# Patient Record
Sex: MALE | Race: WHITE | NOT HISPANIC OR LATINO
[De-identification: names, ages, dates, MRNs, and addresses within clinical notes are randomized per-mention and may not be internally consistent; named-entity substitution may affect disease eponyms.]

---

## 2017-03-31 ENCOUNTER — MOBILE ON CALL (OUTPATIENT)
Age: 52
End: 2017-03-31

## 2017-05-11 ENCOUNTER — OTHER (OUTPATIENT)
Age: 52
End: 2017-05-11

## 2020-01-14 ENCOUNTER — APPOINTMENT (OUTPATIENT)
Dept: CARDIOLOGY | Facility: CLINIC | Age: 55
End: 2020-01-14

## 2020-01-21 ENCOUNTER — LABORATORY RESULT (OUTPATIENT)
Age: 55
End: 2020-01-21

## 2020-01-21 ENCOUNTER — APPOINTMENT (OUTPATIENT)
Dept: CARDIOLOGY | Facility: CLINIC | Age: 55
End: 2020-01-21
Payer: COMMERCIAL

## 2020-01-21 ENCOUNTER — NON-APPOINTMENT (OUTPATIENT)
Age: 55
End: 2020-01-21

## 2020-01-21 VITALS
HEIGHT: 71 IN | WEIGHT: 252 LBS | DIASTOLIC BLOOD PRESSURE: 98 MMHG | HEART RATE: 84 BPM | SYSTOLIC BLOOD PRESSURE: 157 MMHG | BODY MASS INDEX: 35.28 KG/M2 | OXYGEN SATURATION: 100 %

## 2020-01-21 DIAGNOSIS — R07.9 CHEST PAIN, UNSPECIFIED: ICD-10-CM

## 2020-01-21 DIAGNOSIS — I25.10 ATHEROSCLEROTIC HEART DISEASE OF NATIVE CORONARY ARTERY W/OUT ANGINA PECTORIS: ICD-10-CM

## 2020-01-21 DIAGNOSIS — I10 ESSENTIAL (PRIMARY) HYPERTENSION: ICD-10-CM

## 2020-01-21 PROCEDURE — 93000 ELECTROCARDIOGRAM COMPLETE: CPT

## 2020-01-21 PROCEDURE — 99204 OFFICE O/P NEW MOD 45 MIN: CPT

## 2020-01-21 RX ORDER — AMLODIPINE BESYLATE 5 MG/1
5 TABLET ORAL DAILY
Qty: 90 | Refills: 3 | Status: ACTIVE | COMMUNITY
Start: 2020-01-21 | End: 1900-01-01

## 2020-01-21 RX ORDER — LURASIDONE HYDROCHLORIDE 40 MG/1
40 TABLET, FILM COATED ORAL DAILY
Refills: 0 | Status: ACTIVE | COMMUNITY
Start: 2020-01-21

## 2020-01-23 LAB
ALBUMIN SERPL ELPH-MCNC: 4.6 G/DL
ALP BLD-CCNC: 86 U/L
ALT SERPL-CCNC: 18 U/L
ANION GAP SERPL CALC-SCNC: 11 MMOL/L
AST SERPL-CCNC: 13 U/L
BASOPHILS # BLD AUTO: 0.08 K/UL
BASOPHILS NFR BLD AUTO: 0.9 %
BILIRUB SERPL-MCNC: 0.6 MG/DL
BUN SERPL-MCNC: 11 MG/DL
CALCIUM SERPL-MCNC: 10.5 MG/DL
CHLORIDE SERPL-SCNC: 101 MMOL/L
CHOLEST SERPL-MCNC: 241 MG/DL
CHOLEST/HDLC SERPL: 4.9 RATIO
CO2 SERPL-SCNC: 25 MMOL/L
CREAT SERPL-MCNC: 1.03 MG/DL
EOSINOPHIL # BLD AUTO: 0.39 K/UL
EOSINOPHIL NFR BLD AUTO: 4.6 %
ESTIMATED AVERAGE GLUCOSE: 111 MG/DL
GLUCOSE SERPL-MCNC: 125 MG/DL
HBA1C MFR BLD HPLC: 5.5 %
HCT VFR BLD CALC: 44.8 %
HDLC SERPL-MCNC: 49 MG/DL
HGB BLD-MCNC: 14.5 G/DL
IMM GRANULOCYTES NFR BLD AUTO: 0.2 %
LDLC SERPL CALC-MCNC: 163 MG/DL
LYMPHOCYTES # BLD AUTO: 1.08 K/UL
LYMPHOCYTES NFR BLD AUTO: 12.7 %
MAN DIFF?: NORMAL
MCHC RBC-ENTMCNC: 28.5 PG
MCHC RBC-ENTMCNC: 32.4 GM/DL
MCV RBC AUTO: 88.2 FL
MONOCYTES # BLD AUTO: 0.7 K/UL
MONOCYTES NFR BLD AUTO: 8.2 %
NEUTROPHILS # BLD AUTO: 6.23 K/UL
NEUTROPHILS NFR BLD AUTO: 73.4 %
PLATELET # BLD AUTO: 309 K/UL
POTASSIUM SERPL-SCNC: 5.2 MMOL/L
PROT SERPL-MCNC: 7 G/DL
RBC # BLD: 5.08 M/UL
RBC # FLD: 13.8 %
SODIUM SERPL-SCNC: 137 MMOL/L
T3RU NFR SERPL: 1.1 TBI
T4 SERPL-MCNC: 6.9 UG/DL
TRIGL SERPL-MCNC: 145 MG/DL
TSH SERPL-ACNC: 5.2 UIU/ML
WBC # FLD AUTO: 8.5 K/UL

## 2020-01-23 RX ORDER — ROSUVASTATIN CALCIUM 10 MG/1
10 TABLET, FILM COATED ORAL
Qty: 90 | Refills: 3 | Status: ACTIVE | COMMUNITY
Start: 2020-01-23 | End: 1900-01-01

## 2020-01-27 ENCOUNTER — APPOINTMENT (OUTPATIENT)
Dept: CARDIOLOGY | Facility: CLINIC | Age: 55
End: 2020-01-27
Payer: COMMERCIAL

## 2020-01-27 ENCOUNTER — OTHER (OUTPATIENT)
Age: 55
End: 2020-01-27

## 2020-01-27 PROCEDURE — 93306 TTE W/DOPPLER COMPLETE: CPT

## 2020-01-29 ENCOUNTER — APPOINTMENT (OUTPATIENT)
Dept: CARDIOLOGY | Facility: CLINIC | Age: 55
End: 2020-01-29
Payer: COMMERCIAL

## 2020-01-29 PROCEDURE — 78452 HT MUSCLE IMAGE SPECT MULT: CPT

## 2020-01-29 PROCEDURE — A9500: CPT

## 2020-01-29 PROCEDURE — 93015 CV STRESS TEST SUPVJ I&R: CPT

## 2020-03-26 ENCOUNTER — APPOINTMENT (OUTPATIENT)
Dept: CARDIOLOGY | Facility: CLINIC | Age: 55
End: 2020-03-26

## 2021-02-17 ENCOUNTER — NON-APPOINTMENT (OUTPATIENT)
Age: 56
End: 2021-02-17

## 2021-03-16 ENCOUNTER — TRANSCRIPTION ENCOUNTER (OUTPATIENT)
Age: 56
End: 2021-03-16

## 2021-03-16 ENCOUNTER — EMERGENCY (EMERGENCY)
Facility: HOSPITAL | Age: 56
LOS: 0 days | Discharge: HOME | End: 2021-03-16
Attending: EMERGENCY MEDICINE
Payer: COMMERCIAL

## 2021-03-16 VITALS
TEMPERATURE: 99 F | OXYGEN SATURATION: 100 % | RESPIRATION RATE: 18 BRPM | HEART RATE: 67 BPM | SYSTOLIC BLOOD PRESSURE: 196 MMHG | DIASTOLIC BLOOD PRESSURE: 82 MMHG

## 2021-03-16 VITALS
TEMPERATURE: 98 F | SYSTOLIC BLOOD PRESSURE: 152 MMHG | WEIGHT: 250 LBS | HEART RATE: 60 BPM | RESPIRATION RATE: 18 BRPM | HEIGHT: 71 IN | OXYGEN SATURATION: 100 % | DIASTOLIC BLOOD PRESSURE: 74 MMHG

## 2021-03-16 DIAGNOSIS — E78.5 HYPERLIPIDEMIA, UNSPECIFIED: ICD-10-CM

## 2021-03-16 DIAGNOSIS — Z95.5 PRESENCE OF CORONARY ANGIOPLASTY IMPLANT AND GRAFT: ICD-10-CM

## 2021-03-16 DIAGNOSIS — E66.9 OBESITY, UNSPECIFIED: ICD-10-CM

## 2021-03-16 DIAGNOSIS — M54.9 DORSALGIA, UNSPECIFIED: ICD-10-CM

## 2021-03-16 DIAGNOSIS — I10 ESSENTIAL (PRIMARY) HYPERTENSION: ICD-10-CM

## 2021-03-16 DIAGNOSIS — N20.1 CALCULUS OF URETER: ICD-10-CM

## 2021-03-16 DIAGNOSIS — R73.03 PREDIABETES: ICD-10-CM

## 2021-03-16 DIAGNOSIS — R11.0 NAUSEA: ICD-10-CM

## 2021-03-16 DIAGNOSIS — R10.30 LOWER ABDOMINAL PAIN, UNSPECIFIED: ICD-10-CM

## 2021-03-16 DIAGNOSIS — I25.10 ATHEROSCLEROTIC HEART DISEASE OF NATIVE CORONARY ARTERY WITHOUT ANGINA PECTORIS: ICD-10-CM

## 2021-03-16 DIAGNOSIS — Z95.5 PRESENCE OF CORONARY ANGIOPLASTY IMPLANT AND GRAFT: Chronic | ICD-10-CM

## 2021-03-16 DIAGNOSIS — K59.09 OTHER CONSTIPATION: ICD-10-CM

## 2021-03-16 LAB
ALBUMIN SERPL ELPH-MCNC: 4 G/DL — SIGNIFICANT CHANGE UP (ref 3.3–5)
ALP SERPL-CCNC: 90 U/L — SIGNIFICANT CHANGE UP (ref 40–120)
ALT FLD-CCNC: 24 U/L — SIGNIFICANT CHANGE UP (ref 12–78)
ANION GAP SERPL CALC-SCNC: 4 MMOL/L — LOW (ref 5–17)
APPEARANCE UR: ABNORMAL
APTT BLD: 31.1 SEC — SIGNIFICANT CHANGE UP (ref 27.5–35.5)
AST SERPL-CCNC: 12 U/L — LOW (ref 15–37)
BASOPHILS # BLD AUTO: 0.06 K/UL — SIGNIFICANT CHANGE UP (ref 0–0.2)
BASOPHILS NFR BLD AUTO: 0.8 % — SIGNIFICANT CHANGE UP (ref 0–2)
BILIRUB SERPL-MCNC: 0.9 MG/DL — SIGNIFICANT CHANGE UP (ref 0.2–1.2)
BILIRUB UR-MCNC: NEGATIVE — SIGNIFICANT CHANGE UP
BUN SERPL-MCNC: 15 MG/DL — SIGNIFICANT CHANGE UP (ref 7–23)
CALCIUM SERPL-MCNC: 10.8 MG/DL — HIGH (ref 8.5–10.1)
CHLORIDE SERPL-SCNC: 107 MMOL/L — SIGNIFICANT CHANGE UP (ref 96–108)
CO2 SERPL-SCNC: 26 MMOL/L — SIGNIFICANT CHANGE UP (ref 22–31)
COLOR SPEC: YELLOW — SIGNIFICANT CHANGE UP
CREAT SERPL-MCNC: 1.03 MG/DL — SIGNIFICANT CHANGE UP (ref 0.5–1.3)
DIFF PNL FLD: ABNORMAL
EOSINOPHIL # BLD AUTO: 0.25 K/UL — SIGNIFICANT CHANGE UP (ref 0–0.5)
EOSINOPHIL NFR BLD AUTO: 3.2 % — SIGNIFICANT CHANGE UP (ref 0–6)
GLUCOSE SERPL-MCNC: 107 MG/DL — HIGH (ref 70–99)
GLUCOSE UR QL: NEGATIVE — SIGNIFICANT CHANGE UP
HCT VFR BLD CALC: 41.7 % — SIGNIFICANT CHANGE UP (ref 39–50)
HGB BLD-MCNC: 13.7 G/DL — SIGNIFICANT CHANGE UP (ref 13–17)
IMM GRANULOCYTES NFR BLD AUTO: 0.3 % — SIGNIFICANT CHANGE UP (ref 0–1.5)
INR BLD: 1.2 RATIO — HIGH (ref 0.88–1.16)
KETONES UR-MCNC: NEGATIVE — SIGNIFICANT CHANGE UP
LACTATE SERPL-SCNC: 1.7 MMOL/L — SIGNIFICANT CHANGE UP (ref 0.7–2)
LEUKOCYTE ESTERASE UR-ACNC: NEGATIVE — SIGNIFICANT CHANGE UP
LIDOCAIN IGE QN: 44 U/L — LOW (ref 73–393)
LYMPHOCYTES # BLD AUTO: 1.33 K/UL — SIGNIFICANT CHANGE UP (ref 1–3.3)
LYMPHOCYTES # BLD AUTO: 17.1 % — SIGNIFICANT CHANGE UP (ref 13–44)
MCHC RBC-ENTMCNC: 28.7 PG — SIGNIFICANT CHANGE UP (ref 27–34)
MCHC RBC-ENTMCNC: 32.9 GM/DL — SIGNIFICANT CHANGE UP (ref 32–36)
MCV RBC AUTO: 87.2 FL — SIGNIFICANT CHANGE UP (ref 80–100)
MONOCYTES # BLD AUTO: 0.69 K/UL — SIGNIFICANT CHANGE UP (ref 0–0.9)
MONOCYTES NFR BLD AUTO: 8.9 % — SIGNIFICANT CHANGE UP (ref 2–14)
NEUTROPHILS # BLD AUTO: 5.42 K/UL — SIGNIFICANT CHANGE UP (ref 1.8–7.4)
NEUTROPHILS NFR BLD AUTO: 69.7 % — SIGNIFICANT CHANGE UP (ref 43–77)
NITRITE UR-MCNC: NEGATIVE — SIGNIFICANT CHANGE UP
PH UR: 6 — SIGNIFICANT CHANGE UP (ref 5–8)
PLATELET # BLD AUTO: 290 K/UL — SIGNIFICANT CHANGE UP (ref 150–400)
POTASSIUM SERPL-MCNC: 4.4 MMOL/L — SIGNIFICANT CHANGE UP (ref 3.5–5.3)
POTASSIUM SERPL-SCNC: 4.4 MMOL/L — SIGNIFICANT CHANGE UP (ref 3.5–5.3)
PROT SERPL-MCNC: 7.8 GM/DL — SIGNIFICANT CHANGE UP (ref 6–8.3)
PROT UR-MCNC: 15
PROTHROM AB SERPL-ACNC: 13.8 SEC — HIGH (ref 10.6–13.6)
RBC # BLD: 4.78 M/UL — SIGNIFICANT CHANGE UP (ref 4.2–5.8)
RBC # FLD: 13.4 % — SIGNIFICANT CHANGE UP (ref 10.3–14.5)
SODIUM SERPL-SCNC: 137 MMOL/L — SIGNIFICANT CHANGE UP (ref 135–145)
SP GR SPEC: 1 — LOW (ref 1.01–1.02)
UROBILINOGEN FLD QL: NEGATIVE — SIGNIFICANT CHANGE UP
WBC # BLD: 7.77 K/UL — SIGNIFICANT CHANGE UP (ref 3.8–10.5)
WBC # FLD AUTO: 7.77 K/UL — SIGNIFICANT CHANGE UP (ref 3.8–10.5)

## 2021-03-16 PROCEDURE — 96374 THER/PROPH/DIAG INJ IV PUSH: CPT

## 2021-03-16 PROCEDURE — 86901 BLOOD TYPING SEROLOGIC RH(D): CPT

## 2021-03-16 PROCEDURE — 87086 URINE CULTURE/COLONY COUNT: CPT

## 2021-03-16 PROCEDURE — 36415 COLL VENOUS BLD VENIPUNCTURE: CPT

## 2021-03-16 PROCEDURE — 99284 EMERGENCY DEPT VISIT MOD MDM: CPT | Mod: 25

## 2021-03-16 PROCEDURE — 83690 ASSAY OF LIPASE: CPT

## 2021-03-16 PROCEDURE — 83605 ASSAY OF LACTIC ACID: CPT

## 2021-03-16 PROCEDURE — 85025 COMPLETE CBC W/AUTO DIFF WBC: CPT

## 2021-03-16 PROCEDURE — 86900 BLOOD TYPING SEROLOGIC ABO: CPT

## 2021-03-16 PROCEDURE — 80053 COMPREHEN METABOLIC PANEL: CPT

## 2021-03-16 PROCEDURE — 99285 EMERGENCY DEPT VISIT HI MDM: CPT

## 2021-03-16 PROCEDURE — 74177 CT ABD & PELVIS W/CONTRAST: CPT | Mod: 26

## 2021-03-16 PROCEDURE — 85610 PROTHROMBIN TIME: CPT

## 2021-03-16 PROCEDURE — 85730 THROMBOPLASTIN TIME PARTIAL: CPT

## 2021-03-16 PROCEDURE — 86850 RBC ANTIBODY SCREEN: CPT

## 2021-03-16 PROCEDURE — 81001 URINALYSIS AUTO W/SCOPE: CPT

## 2021-03-16 PROCEDURE — 74177 CT ABD & PELVIS W/CONTRAST: CPT

## 2021-03-16 RX ORDER — IBUPROFEN 200 MG
1 TABLET ORAL
Qty: 20 | Refills: 0
Start: 2021-03-16 | End: 2021-03-20

## 2021-03-16 RX ORDER — ACETAMINOPHEN 500 MG
2 TABLET ORAL
Qty: 40 | Refills: 0
Start: 2021-03-16 | End: 2021-03-20

## 2021-03-16 RX ORDER — SODIUM CHLORIDE 9 MG/ML
1000 INJECTION INTRAMUSCULAR; INTRAVENOUS; SUBCUTANEOUS ONCE
Refills: 0 | Status: COMPLETED | OUTPATIENT
Start: 2021-03-16 | End: 2021-03-16

## 2021-03-16 RX ORDER — TAMSULOSIN HYDROCHLORIDE 0.4 MG/1
1 CAPSULE ORAL
Qty: 7 | Refills: 0
Start: 2021-03-16 | End: 2021-03-22

## 2021-03-16 RX ORDER — OXYCODONE HYDROCHLORIDE 5 MG/1
1 TABLET ORAL
Qty: 12 | Refills: 0
Start: 2021-03-16 | End: 2021-03-18

## 2021-03-16 RX ORDER — KETOROLAC TROMETHAMINE 30 MG/ML
15 SYRINGE (ML) INJECTION ONCE
Refills: 0 | Status: DISCONTINUED | OUTPATIENT
Start: 2021-03-16 | End: 2021-03-16

## 2021-03-16 RX ADMIN — Medication 15 MILLIGRAM(S): at 21:52

## 2021-03-16 RX ADMIN — SODIUM CHLORIDE 1000 MILLILITER(S): 9 INJECTION INTRAMUSCULAR; INTRAVENOUS; SUBCUTANEOUS at 19:57

## 2021-03-16 NOTE — ED STATDOCS - PROGRESS NOTE DETAILS
56 y/o M presents with L flank pain since last night. pt reports severe L sided flank pain that radiating to lower abdomen pain was severe in intensity associated with nausea, pain has since improved. Pt had small BM this morning. Denies fever/chills, v/d, Cp, SOB, urinary sx or other complaints at this time  abd: soft, nd, NTTP. No r/g/r. -Alvaro Michael PA-C Results reviewed and discussed with pt. Pt is feeling well, will dc with pain control, flomax, and urology FU. Discussed importance of close FU with PMD. Pt asked to return to ED immediately for any new or concerning sx or worsening. Pt acknowledges and understands plan

## 2021-03-16 NOTE — ED STATDOCS - OBJECTIVE STATEMENT
56 y/o M with PMHx of CAD s/p coronary artery stent placement, HTN, HLD, prediabetes, chronic constipation, obesity, and anxiety presents ambulatory to the ED c/o intermittent severe +abd cramping beginning last night. Also with stabbing +back pain. Took Miralax last night for chronic constipation, had small BM then pain began. In the ED currently without pain. No fever. Former smoker. NKDA.

## 2021-03-16 NOTE — ED ADULT TRIAGE NOTE - CHIEF COMPLAINT QUOTE
Pt states onset of left flank pain + nausea, last bowel movement was last night but c/o chronic constipation. Pt denies chest pain or shortness of breath.

## 2021-03-16 NOTE — ED STATDOCS - PMH
Anxiety    CAD (coronary artery disease)    Chronic constipation    HLD (hyperlipidemia)    HTN (hypertension)    Obesity    Prediabetes

## 2021-03-16 NOTE — ED STATDOCS - NS_ ATTENDINGSCRIBEDETAILS _ED_A_ED_FT
I, Angelito Salgado MD,  performed the initial face to face bedside interview with this patient regarding history of present illness, review of symptoms and relevant past medical, social and family history.  I completed an independent physical examination.    The history, relevant review of systems, past medical and surgical history, medical decision making, and physical examination was documented by the scribe in my presence and I attest to the accuracy of the documentation.

## 2021-03-16 NOTE — ED STATDOCS - NSFOLLOWUPINSTRUCTIONS_ED_ALL_ED_FT
Please follow up with Primary MD in 2-3 days. Return to ED immediately for any new or concerning symptoms or worsening symptoms     Kidney Stones    WHAT YOU NEED TO KNOW:    Kidney stones form in the urinary system when the water and waste in your urine are out of balance. When this happens, certain types of waste crystals separate from the urine. The crystals build up and form kidney stones. You may have more than one kidney stone.     DISCHARGE INSTRUCTIONS:    Return to the emergency department if:     You have vomiting that is not relieved by medicine.        Contact your healthcare provider if:     You have a fever.       You have trouble passing urine.      You see blood in your urine.      You have severe pain.      You have any questions or concerns about your condition or care.    Medicines:     NSAIDs, such as ibuprofen, help decrease swelling, pain, and fever. This medicine is available with or without a doctor's order. NSAIDs can cause stomach bleeding or kidney problems in certain people. If you take blood thinner medicine, always ask your healthcare provider if NSAIDs are safe for you. Always read the medicine label and follow directions.      Prescription pain medicine may be given. Ask your healthcare provider how to take this medicine safely. Some prescription pain medicines contain acetaminophen. Do not take other medicines that contain acetaminophen without talking to your healthcare provider. Too much acetaminophen may cause liver damage. Prescription pain medicine may cause constipation. Ask your healthcare provider how to prevent or treat constipation.       Medicines to balance your electrolytes may be needed.       Take your medicine as directed. Contact your healthcare provider if you think your medicine is not helping or if you have side effects. Tell him or her if you are allergic to any medicine. Keep a list of the medicines, vitamins, and herbs you take. Include the amounts, and when and why you take them. Bring the list or the pill bottles to follow-up visits. Carry your medicine list with you in case of an emergency.    Follow up with your healthcare provider as directed: You may need to return for more tests. Write down your questions so you remember to ask them during your visits.    What you can do to manage kidney stones:     Drink more liquids. Your healthcare provider may tell you to drink at least 8 to 12 (eight-ounce) cups of liquids each day. This helps flush out the kidney stones when you urinate. Water is the best liquid to drink.      Strain your urine every time you go to the bathroom. Urinate through a strainer or a piece of thin cloth to catch the stones. Take the stones to your healthcare provider so they can be sent to the lab for tests. This will help your healthcare providers plan the best treatment for you.Look for Stones in the Filter           Eat a variety of healthy foods. Healthy foods include fruits, vegetables, whole-grain breads, low-fat dairy products, beans, and fish. You may need to limit how much sodium (salt) or protein you eat. Ask for information about the best foods for you.      Stay active. Your stones may pass more easily if you stay active. Exercise can also help you manage your weight. Ask about the best activities for you.    After you pass the kidney stones: Your healthcare provider may order a 24-hour urine test. Results from a 24-hour urine test will help your healthcare provider plan ways to prevent more stones from forming. Your healthcare provider will give you more instructions.

## 2021-03-16 NOTE — ED STATDOCS - CARE PROVIDER_API CALL
Uziel Leone)  Urology  [address], [address] [place] [address]  Phone: [phone]  Fax: [phone]  Follow Up Time: 4-6 Days

## 2021-03-16 NOTE — ED STATDOCS - PATIENT PORTAL LINK FT
You can access the FollowMyHealth Patient Portal offered by [facility] by registering at the following website: [email or web address]. By joining [facility]’s FollowMyHealth portal, you will also be able to view your health information using other applications (apps) compatible with our system.

## 2021-03-17 LAB
CULTURE RESULTS: SIGNIFICANT CHANGE UP
SPECIMEN SOURCE: SIGNIFICANT CHANGE UP

## 2021-03-17 RX ORDER — OXYCODONE HYDROCHLORIDE 5 MG/1
1 TABLET ORAL
Qty: 12 | Refills: 0
Start: 2021-03-17 | End: 2021-03-19

## 2021-04-12 ENCOUNTER — APPOINTMENT (OUTPATIENT)
Dept: UROLOGY | Facility: CLINIC | Age: 56
End: 2021-04-12

## 2021-10-04 NOTE — ED ADULT NURSE NOTE - OBJECTIVE STATEMENT
Neck , no lymphadenopathy
Patient presents to ED complaining of flank pain. Patient complaining of sudden onset of abdominal pain radiating to L flank starting last night, patient complaining of one episode of vomiting, states due to pain. Patient denies urinary symptoms, symptoms [id number] on arrival. Patient complaining of chronic constipation, BM yesterday. Patient denies fever, chills, nausea, diarrhea, SOB.

## 2021-10-07 ENCOUNTER — OUTPATIENT (OUTPATIENT)
Dept: OUTPATIENT SERVICES | Facility: HOSPITAL | Age: 56
LOS: 1 days | Discharge: STILL ADMITTED OR ADMINISTRATIVE | End: 2021-10-07
Payer: MEDICAID

## 2021-10-07 DIAGNOSIS — F31.9 BIPOLAR DISORDER, UNSPECIFIED: ICD-10-CM

## 2021-10-07 DIAGNOSIS — Z95.5 PRESENCE OF CORONARY ANGIOPLASTY IMPLANT AND GRAFT: Chronic | ICD-10-CM

## 2021-10-07 PROBLEM — R73.03 PREDIABETES: Chronic | Status: ACTIVE | Noted: 2021-03-16

## 2021-10-07 PROBLEM — I10 ESSENTIAL (PRIMARY) HYPERTENSION: Chronic | Status: ACTIVE | Noted: 2021-03-16

## 2021-10-07 PROBLEM — F41.9 ANXIETY DISORDER, UNSPECIFIED: Chronic | Status: ACTIVE | Noted: 2021-03-16

## 2021-10-07 PROBLEM — I25.10 ATHEROSCLEROTIC HEART DISEASE OF NATIVE CORONARY ARTERY WITHOUT ANGINA PECTORIS: Chronic | Status: ACTIVE | Noted: 2021-03-16

## 2021-10-07 PROBLEM — E78.5 HYPERLIPIDEMIA, UNSPECIFIED: Chronic | Status: ACTIVE | Noted: 2021-03-16

## 2021-10-07 PROBLEM — E66.9 OBESITY, UNSPECIFIED: Chronic | Status: ACTIVE | Noted: 2021-03-16

## 2021-10-07 PROBLEM — K59.09 OTHER CONSTIPATION: Chronic | Status: ACTIVE | Noted: 2021-03-16

## 2021-10-07 PROCEDURE — 99215 OFFICE O/P EST HI 40 MIN: CPT

## 2023-07-09 ENCOUNTER — NON-APPOINTMENT (OUTPATIENT)
Age: 58
End: 2023-07-09

## 2023-07-09 ENCOUNTER — INPATIENT (INPATIENT)
Facility: HOSPITAL | Age: 58
LOS: 3 days | Discharge: HOME | DRG: 392 | End: 2023-07-13
Attending: FAMILY MEDICINE | Admitting: SURGERY
Payer: MEDICAID

## 2023-07-09 VITALS
HEART RATE: 87 BPM | SYSTOLIC BLOOD PRESSURE: 118 MMHG | WEIGHT: 238.1 LBS | HEIGHT: 71 IN | OXYGEN SATURATION: 98 % | TEMPERATURE: 98 F | DIASTOLIC BLOOD PRESSURE: 67 MMHG | RESPIRATION RATE: 16 BRPM

## 2023-07-09 DIAGNOSIS — Z95.5 PRESENCE OF CORONARY ANGIOPLASTY IMPLANT AND GRAFT: Chronic | ICD-10-CM

## 2023-07-09 PROCEDURE — 93010 ELECTROCARDIOGRAM REPORT: CPT

## 2023-07-09 PROCEDURE — 99285 EMERGENCY DEPT VISIT HI MDM: CPT

## 2023-07-09 RX ORDER — SODIUM CHLORIDE 9 MG/ML
1000 INJECTION INTRAMUSCULAR; INTRAVENOUS; SUBCUTANEOUS ONCE
Refills: 0 | Status: COMPLETED | OUTPATIENT
Start: 2023-07-09 | End: 2023-07-09

## 2023-07-09 NOTE — ED PROVIDER NOTE - NSICDXPASTMEDICALHX_GEN_ALL_CORE_FT
PAST MEDICAL HISTORY:  Anxiety     CAD (coronary artery disease)     Chronic constipation     HLD (hyperlipidemia)     HTN (hypertension)     Obesity     Prediabetes

## 2023-07-09 NOTE — ED ADULT TRIAGE NOTE - CHIEF COMPLAINT QUOTE
Comes to ED for worsening fatigue and inability to care for self.  Pt also endorses bloody stools, appears pale in triage, abdomen distended.  Sister stated that pt has lost a lot of weight over the last few months and appears to be 'wasting away'.

## 2023-07-09 NOTE — ED PROVIDER NOTE - SIGNIFICANT NEGATIVE FINDINGS
no headache, no chest pain, no SOB, no palpitations, no fever, no chills,  no n/v/d, no urinary symptoms . no neuro changes.

## 2023-07-09 NOTE — ED PROVIDER NOTE - RELIEVING FACTORS
Pt stating rx for amlodipine needs to be resent to [facility] will not cover rx at [facility] due to new [id number]-   no

## 2023-07-09 NOTE — ED PROVIDER NOTE - OBJECTIVE STATEMENT
Comes to ED for worsening fatigue and inability to care for self.  Pt also endorses bloody stools, appears pale in triage, abdomen distended.  Sister stated that pt has lost a lot of weight over the last few months and appears to be 'wasting away'.  see chief complaint quote Comes to ED for worsening fatigue and inability to care for self.  Pt also endorses bloody stools, appears pale in triage, abdomen distended.  Sister stated that pt has lost a lot of weight over the last few months and appears to be 'wasting away'.  see chief complaint quote Gi Dr Perlman, skipped colonoscopy and blood testing including CEA level. 58 y/o male h/o Anxiety CAD Stent Chronic constipation HLD HTN, Renal stone  C/C abdominal pain, bloating, distention, bloody stools. His sister notes that he is experiencing increasing weakness,  fatigue, unsteadiness  and inability to care for self.   She states that the patient  lost 40 pounds  over the past  few months and appears to be 'wasting away'. He is  non compliant with his doctors appointments and missed his colonoscopy appointment, also missed going for his  blood testing witch included CEA level, his GI is  Dr Perlman  no headache, no chest pain, no SOB, no palpitations, no fever, no chills,  no n/v/d, no urinary symptoms . no neuro changes.

## 2023-07-09 NOTE — ED ADULT TRIAGE NOTE - PAIN: PRESENCE, MLM
PT ADVISED TO HOLD PRESSURE TO THE SOCKETS WHERE THE TEETH WERE PULLED, COLD ICE WATER. IF IT JUST DOESN'T STOP HE WILL NEED TO GO BACK TO THE ER. PT VERBALIZED UNDERSTANDING      ----- Message from Carmen Flores sent at 4/27/2023  8:23 AM EDT -----  Patient had dental surgery on Monday. Went to the ER for excessive bleeding, tender, they packed his mouth with gauze with acid on them and sent him home. He saw the surgeon yesterday and he did nothing for him. He continues to have excessive bleeding.       complains of pain/discomfort

## 2023-07-10 ENCOUNTER — TRANSCRIPTION ENCOUNTER (OUTPATIENT)
Age: 58
End: 2023-07-10

## 2023-07-10 DIAGNOSIS — K56.2 VOLVULUS: ICD-10-CM

## 2023-07-10 DIAGNOSIS — R14.0 ABDOMINAL DISTENSION (GASEOUS): ICD-10-CM

## 2023-07-10 DIAGNOSIS — Z29.9 ENCOUNTER FOR PROPHYLACTIC MEASURES, UNSPECIFIED: ICD-10-CM

## 2023-07-10 LAB
ALBUMIN SERPL ELPH-MCNC: 3.1 G/DL — LOW (ref 3.3–5)
ALP SERPL-CCNC: 121 U/L — HIGH (ref 40–120)
ALT FLD-CCNC: 19 U/L — SIGNIFICANT CHANGE UP (ref 12–78)
AMYLASE P1 CFR SERPL: 29 U/L — SIGNIFICANT CHANGE UP (ref 25–125)
ANION GAP SERPL CALC-SCNC: 3 MMOL/L — LOW (ref 5–17)
APPEARANCE UR: CLEAR — SIGNIFICANT CHANGE UP
APTT BLD: 28.7 SEC — SIGNIFICANT CHANGE UP (ref 27.5–35.5)
AST SERPL-CCNC: 17 U/L — SIGNIFICANT CHANGE UP (ref 15–37)
BASOPHILS # BLD AUTO: 0.04 K/UL — SIGNIFICANT CHANGE UP (ref 0–0.2)
BASOPHILS NFR BLD AUTO: 0.8 % — SIGNIFICANT CHANGE UP (ref 0–2)
BILIRUB SERPL-MCNC: 0.6 MG/DL — SIGNIFICANT CHANGE UP (ref 0.2–1.2)
BILIRUB UR-MCNC: NEGATIVE — SIGNIFICANT CHANGE UP
BLD GP AB SCN SERPL QL: SIGNIFICANT CHANGE UP
BUN SERPL-MCNC: 26 MG/DL — HIGH (ref 7–23)
CALCIUM SERPL-MCNC: 11.9 MG/DL — HIGH (ref 8.5–10.1)
CALCIUM SERPL-MCNC: 12 MG/DL — HIGH (ref 8.5–10.1)
CHLORIDE SERPL-SCNC: 110 MMOL/L — HIGH (ref 96–108)
CO2 SERPL-SCNC: 28 MMOL/L — SIGNIFICANT CHANGE UP (ref 22–31)
COLOR SPEC: YELLOW — SIGNIFICANT CHANGE UP
CREAT SERPL-MCNC: 1.1 MG/DL — SIGNIFICANT CHANGE UP (ref 0.5–1.3)
DIFF PNL FLD: NEGATIVE — SIGNIFICANT CHANGE UP
EGFR: 78 ML/MIN/1.73M2 — SIGNIFICANT CHANGE UP
EOSINOPHIL # BLD AUTO: 0.39 K/UL — SIGNIFICANT CHANGE UP (ref 0–0.5)
EOSINOPHIL NFR BLD AUTO: 8.2 % — HIGH (ref 0–6)
ETHANOL SERPL-MCNC: <10 MG/DL — SIGNIFICANT CHANGE UP (ref 0–10)
GLUCOSE SERPL-MCNC: 121 MG/DL — HIGH (ref 70–99)
GLUCOSE UR QL: NEGATIVE MG/DL — SIGNIFICANT CHANGE UP
HCT VFR BLD CALC: 34.9 % — LOW (ref 39–50)
HGB BLD-MCNC: 11.3 G/DL — LOW (ref 13–17)
IMM GRANULOCYTES NFR BLD AUTO: 0.2 % — SIGNIFICANT CHANGE UP (ref 0–0.9)
INR BLD: 1.33 RATIO — HIGH (ref 0.88–1.16)
KETONES UR-MCNC: NEGATIVE MG/DL — SIGNIFICANT CHANGE UP
LACTATE SERPL-SCNC: 0.8 MMOL/L — SIGNIFICANT CHANGE UP (ref 0.7–2)
LEUKOCYTE ESTERASE UR-ACNC: NEGATIVE — SIGNIFICANT CHANGE UP
LIDOCAIN IGE QN: 85 U/L — SIGNIFICANT CHANGE UP (ref 73–393)
LYMPHOCYTES # BLD AUTO: 1.12 K/UL — SIGNIFICANT CHANGE UP (ref 1–3.3)
LYMPHOCYTES # BLD AUTO: 23.4 % — SIGNIFICANT CHANGE UP (ref 13–44)
MCHC RBC-ENTMCNC: 27.1 PG — SIGNIFICANT CHANGE UP (ref 27–34)
MCHC RBC-ENTMCNC: 32.4 GM/DL — SIGNIFICANT CHANGE UP (ref 32–36)
MCV RBC AUTO: 83.7 FL — SIGNIFICANT CHANGE UP (ref 80–100)
MONOCYTES # BLD AUTO: 0.71 K/UL — SIGNIFICANT CHANGE UP (ref 0–0.9)
MONOCYTES NFR BLD AUTO: 14.9 % — HIGH (ref 2–14)
NEUTROPHILS # BLD AUTO: 2.51 K/UL — SIGNIFICANT CHANGE UP (ref 1.8–7.4)
NEUTROPHILS NFR BLD AUTO: 52.5 % — SIGNIFICANT CHANGE UP (ref 43–77)
NITRITE UR-MCNC: NEGATIVE — SIGNIFICANT CHANGE UP
NRBC # BLD: 0 /100 WBCS — SIGNIFICANT CHANGE UP (ref 0–0)
PH UR: 5.5 — SIGNIFICANT CHANGE UP (ref 5–8)
PLATELET # BLD AUTO: 183 K/UL — SIGNIFICANT CHANGE UP (ref 150–400)
POTASSIUM SERPL-MCNC: 4 MMOL/L — SIGNIFICANT CHANGE UP (ref 3.5–5.3)
POTASSIUM SERPL-SCNC: 4 MMOL/L — SIGNIFICANT CHANGE UP (ref 3.5–5.3)
PROT SERPL-MCNC: 6.3 G/DL — SIGNIFICANT CHANGE UP (ref 6–8.3)
PROT UR-MCNC: NEGATIVE MG/DL — SIGNIFICANT CHANGE UP
PROTHROM AB SERPL-ACNC: 15.6 SEC — HIGH (ref 10.5–13.4)
RBC # BLD: 4.17 M/UL — LOW (ref 4.2–5.8)
RBC # FLD: 14.3 % — SIGNIFICANT CHANGE UP (ref 10.3–14.5)
SODIUM SERPL-SCNC: 141 MMOL/L — SIGNIFICANT CHANGE UP (ref 135–145)
SP GR SPEC: 1.02 — SIGNIFICANT CHANGE UP (ref 1–1.03)
UROBILINOGEN FLD QL: 1 MG/DL — SIGNIFICANT CHANGE UP (ref 0.2–1)
WBC # BLD: 4.78 K/UL — SIGNIFICANT CHANGE UP (ref 3.8–10.5)
WBC # FLD AUTO: 4.78 K/UL — SIGNIFICANT CHANGE UP (ref 3.8–10.5)

## 2023-07-10 PROCEDURE — 71260 CT THORAX DX C+: CPT | Mod: 26,MA

## 2023-07-10 PROCEDURE — 74177 CT ABD & PELVIS W/CONTRAST: CPT | Mod: 26,MA

## 2023-07-10 PROCEDURE — 70450 CT HEAD/BRAIN W/O DYE: CPT | Mod: 26,MA

## 2023-07-10 PROCEDURE — 99222 1ST HOSP IP/OBS MODERATE 55: CPT

## 2023-07-10 RX ORDER — LUMATEPERONE 10.5 MG/1
1 CAPSULE ORAL
Refills: 0 | DISCHARGE

## 2023-07-10 RX ORDER — AMLODIPINE BESYLATE 2.5 MG/1
2.5 TABLET ORAL EVERY 24 HOURS
Refills: 0 | Status: DISCONTINUED | OUTPATIENT
Start: 2023-07-10 | End: 2023-07-11

## 2023-07-10 RX ORDER — LAMOTRIGINE 25 MG/1
200 TABLET, ORALLY DISINTEGRATING ORAL DAILY
Refills: 0 | Status: DISCONTINUED | OUTPATIENT
Start: 2023-07-10 | End: 2023-07-13

## 2023-07-10 RX ORDER — LIOTHYRONINE SODIUM 25 UG/1
200 TABLET ORAL DAILY
Refills: 0 | Status: DISCONTINUED | OUTPATIENT
Start: 2023-07-10 | End: 2023-07-13

## 2023-07-10 RX ORDER — HEPARIN SODIUM 5000 [USP'U]/ML
5000 INJECTION INTRAVENOUS; SUBCUTANEOUS EVERY 12 HOURS
Refills: 0 | Status: DISCONTINUED | OUTPATIENT
Start: 2023-07-10 | End: 2023-07-10

## 2023-07-10 RX ORDER — QUETIAPINE FUMARATE 200 MG/1
50 TABLET, FILM COATED ORAL AT BEDTIME
Refills: 0 | Status: DISCONTINUED | OUTPATIENT
Start: 2023-07-10 | End: 2023-07-13

## 2023-07-10 RX ORDER — LAMOTRIGINE 25 MG/1
1 TABLET, ORALLY DISINTEGRATING ORAL
Refills: 0 | DISCHARGE

## 2023-07-10 RX ORDER — QUETIAPINE FUMARATE 200 MG/1
1 TABLET, FILM COATED ORAL
Refills: 0 | DISCHARGE

## 2023-07-10 RX ORDER — ONDANSETRON 8 MG/1
4 TABLET, FILM COATED ORAL
Refills: 0 | Status: DISCONTINUED | OUTPATIENT
Start: 2023-07-10 | End: 2023-07-13

## 2023-07-10 RX ORDER — LITHIUM CARBONATE 300 MG/1
450 TABLET, EXTENDED RELEASE ORAL AT BEDTIME
Refills: 0 | Status: DISCONTINUED | OUTPATIENT
Start: 2023-07-10 | End: 2023-07-13

## 2023-07-10 RX ORDER — SOD SULF/SODIUM/NAHCO3/KCL/PEG
1000 SOLUTION, RECONSTITUTED, ORAL ORAL EVERY 4 HOURS
Refills: 0 | Status: COMPLETED | OUTPATIENT
Start: 2023-07-10 | End: 2023-07-11

## 2023-07-10 RX ORDER — LIOTHYRONINE SODIUM 25 UG/1
4 TABLET ORAL
Refills: 0 | DISCHARGE

## 2023-07-10 RX ORDER — LITHIUM CARBONATE 300 MG/1
1 TABLET, EXTENDED RELEASE ORAL
Refills: 0 | DISCHARGE

## 2023-07-10 RX ADMIN — QUETIAPINE FUMARATE 50 MILLIGRAM(S): 200 TABLET, FILM COATED ORAL at 21:47

## 2023-07-10 RX ADMIN — Medication 10 MILLIGRAM(S): at 19:05

## 2023-07-10 RX ADMIN — SODIUM CHLORIDE 1000 MILLILITER(S): 9 INJECTION INTRAMUSCULAR; INTRAVENOUS; SUBCUTANEOUS at 01:11

## 2023-07-10 RX ADMIN — AMLODIPINE BESYLATE 2.5 MILLIGRAM(S): 2.5 TABLET ORAL at 19:04

## 2023-07-10 RX ADMIN — LITHIUM CARBONATE 450 MILLIGRAM(S): 300 TABLET, EXTENDED RELEASE ORAL at 21:47

## 2023-07-10 RX ADMIN — SODIUM CHLORIDE 1000 MILLILITER(S): 9 INJECTION INTRAMUSCULAR; INTRAVENOUS; SUBCUTANEOUS at 00:11

## 2023-07-10 NOTE — ED ADULT NURSE NOTE - PAIN: PRESENCE, MLM
The patient is a 51y Female complaining of 
abdominal pain, Nausea without vomiting/complains of pain/discomfort

## 2023-07-10 NOTE — CONSULT NOTE ADULT - ASSESSMENT
Patient is a 58yo male with a PMH of HTN, HLD, CAD s/p PCIx3 stents in 2016, Bipolar disorder and failure to thrive for the past 6 months. Scheduled for colonoscopy with GI tomorrow.     - EKG shows 1st degree AV block, not present in previous EKG in 2020   - Patient is not complaining of any cardiac symptoms at this time.  - No meaningful evidence of volume overload.  - BP well controlled, monitor routine hemodynamics.  - Continue Amlodipine   - Monitor and replete lytes, keep K>4, Mg>2.  - Pt has history of CAD s/p PCI 3 stents in 2016. No other notable cardiac risk factors.  - RCRI score 6% class II risk  - Mets 3, patient has difficulty walking long distance or climbing up stairs   - Pt is optimized from cardiovascular standpoint to proceed with planned procedure with routine hemodynamic monitoring.   - Other cardiovascular workup will depend on clinical course.  - All other workup per primary team.  - Will continue to follow.             Patient is a 58yo male with a PMH of HTN, HLD, CAD s/p PCIx3 stents in 2016, Bipolar disorder and failure to thrive for the past 6 months. Scheduled for colonoscopy with GI tomorrow.     - No signs of significant ischemia or volume overload.   - EKG shows 1st degree AV block, not present in previous EKG in 2020   -  Pt has history of CAD s/p PCI 3 stents in 2016.    - not on antiplatelets. consider starting ASA if possible.     - bp controlled.   - Continue Amlodipine     - RCRI score 6% class II risk  - Mets 3, patient has difficulty walking long distance or climbing up stairs   - Pt is optimized from cardiovascular standpoint to proceed with planned procedure with routine hemodynamic monitoring.     - Other cardiovascular workup will depend on clinical course.  - All other workup per primary team.  - Will continue to follow.

## 2023-07-10 NOTE — ED ADULT NURSE REASSESSMENT NOTE - NS ED NURSE REASSESS COMMENT FT1
Discussed with MD Gramajo.  Surgical PA in ED as well to speak to pt.  MD explained that procedure can't be done without bowel prep, pt understands.

## 2023-07-10 NOTE — H&P ADULT - NSHPLABSRESULTS_GEN_ALL_CORE
Lactate, Blood (07.10.23 @ 05:30)    Lactate, Blood: 0.8 mmol/L  Amylase (07.09.23 @ 23:55)    Amylase: 29 U/L  Lipase (07.09.23 @ 23:55)    Lipase: 85 U/L  Complete Blood Count + Automated Diff (07.09.23 @ 23:55)    WBC Count: 4.78 K/uL    RBC Count: 4.17 M/uL    Hemoglobin: 11.3 g/dL    Hematocrit: 34.9 %    Mean Cell Volume: 83.7 fl    Mean Cell Hemoglobin: 27.1 pg    Mean Cell Hemoglobin Conc: 32.4 gm/dL    Red Cell Distrib Width: 14.3 %    Platelet Count - Automated: 183 K/uL    Auto Neutrophil #: 2.51 K/uL    Auto Lymphocyte #: 1.12 K/uL    Auto Monocyte #: 0.71 K/uL    Auto Eosinophil #: 0.39 K/uL    Auto Basophil #: 0.04 K/uL    Auto Neutrophil %: 52.5: Differential percentages must be correlated with absolute numbers for  clinical significance. %    Auto Lymphocyte %: 23.4 %    Auto Monocyte %: 14.9 %    Auto Eosinophil %: 8.2 %    Auto Basophil %: 0.8 %    Auto Immature Granulocyte %: 0.2: (Includes meta, myelo and promyelocytes). Mild elevations in immature  granulocytes may be seen with many inflammatory processes and pregnancy;  clinical correlation suggested. %    Nucleated RBC: 0 /100 WBCs    CMP  Aspartate Aminotransferase (AST/SGOT): 17 U/L (07.09.23 @ 23:55)  Alanine Aminotransferase (ALT/SGPT): 19 U/L (07.09.23 @ 23:55)  Bilirubin Total: 0.6 mg/dL (07.09.23 @ 23:55)          < from: CT Chest w/ IV Cont (07.10.23 @ 01:37) >    IMPRESSION:    The sigmoid colon is markedly air distended measuring up to 15.5 cm.   There is twisting of the mesentery best seen on (601:38-48). There is no   colonic wall thickening or pneumatosis at this time. There is mild   mesenteric edema. There is no significant obstruction proximally.   Recommend surgical consultation.      1.2 cm left lower lobe nodule versus focus of atelectasis (2:58).   Recommend follow-up noncontrast chest CT in 3 months.    1.7 cm right lobe thyroid nodule. Nonemergent thyroid ultrasound is   recommended.    There is a 4.9 x 3.3 cm well-circumscribed fluid collection in the right   cardiophrenic fat (601:8 5). This is unchanged compared to the previous   CT of 3/16/2021. This may represent a pericardial cyst or lymphangioma or   other cystic structure. If further evaluation is needed dedicated chest   MRI can be obtained.    Splenomegaly. Spleen measures 16.1 cm, previously 15.2 cm.    Thickening of the distal esophagus. This would be better evaluated with   endoscopy.    1.5 cm indeterminant left renal cortical cyst possibly a proteinaceous or   hemorrhagic cyst. Previously subcentimeter in size. Nonemergent renal   ultrasound is recommended.        --- End of Report ---            JACK MEYER MD; Attending Radiologist  This document has been electronically signed. Jul 10 2023  2:44AM    < end of copied text >

## 2023-07-10 NOTE — ED ADULT NURSE REASSESSMENT NOTE - NS ED NURSE REASSESS COMMENT FT1
Plan of care discussed with sister via telephone. RN Charge Christelle made aware. will continue to monitor.

## 2023-07-10 NOTE — ED ADULT NURSE REASSESSMENT NOTE - NS ED NURSE REASSESS COMMENT FT1
Spoke at length multiple times this shift with patient about his plan of care and colonoscopy procedure tomorrow.  Educated patient on importance and necessity of bowel prep and enema to prepare for colonoscopy tomorrow however despite all attempts, pt refused to have enema or take any bowel prep until he gets to his room.  Pt does not want to do bowel prep in the ED, refused bedside commode and does not want to use the ER bathroom with all the other patients using the bathroom as well.  Pt continued to refuse bowel prep after multiple attempts.  MD Sheik chapa.

## 2023-07-10 NOTE — CONSULT NOTE ADULT - ASSESSMENT
abnormal CT    CT scan reviewed  no swirrling to suggest volvulus as previously described  large amount of stool in rectum  admit to surgery  will need smog enema  will need colonoscopy with prep, scheduled for tomorrow  d/w patient who agrees  clear liquid diet  will follow  show

## 2023-07-10 NOTE — H&P ADULT - ASSESSMENT
Patient is a 56yo male with a PMH of HTN, HLD, CAD s/p PCIx3 stents, Bipolar disorder and failure to thrive for the past 6 months. He presented to the ED with approximately 1 year of abdominal distension that he reports has improved recently. Pt also reports gradual onset of weakness, dizziness and approximately 40lb weight loss during the past 6 months. He notes that he still has an appetite but does not have the energy to sometimes prepare or fully eat his meals. He has had flatus, no nausea or vomiting, and no abdominal pain. He reports that he has never received a colonoscopy and has not had any previous abdominal surgeries. WBC and LFT within normal limits.Sigmoid colon is markedly distended but no obstruction is noted.    Plan:  - GI will perform colonoscopy tomorrow (7/11/23)  - rule out volvulus  - rule out lesion  - continue medical management per GI     Patient is a 58yo male with a PMH of HTN, HLD, CAD s/p PCIx3 stents, Bipolar disorder and failure to thrive for the past 6 months. He presented to the ED with approximately 1 year of abdominal distension that he reports has improved recently. Pt also reports gradual onset of weakness, dizziness and approximately 40lb weight loss during the past 6 months. He notes that he still has an appetite but does not have the energy to sometimes prepare or fully eat his meals. He has had flatus, no nausea or vomiting, and no abdominal pain. He reports that he has never received a colonoscopy and has not had any previous abdominal surgeries. WBC and LFT within normal limits.Sigmoid colon is markedly distended but no obstruction is noted.    Plan:    Abdominal Distension  - GI will perform colonoscopy tomorrow (7/11/23)  - rule out volvulus  - rule out lesion  - continue medical management per GI  - sips, chips, meds, gum and hard candy only for now  - SCDs while in bed  - no LVX    Thyroid Nodule  - spoke with Dr. Jones, patient can follow up in outpatient clinic for thyroid ultrasound  - Patient is currently scheduled for 8/3/2023 at 10:45 at [facility] [phone]

## 2023-07-10 NOTE — CONSULT NOTE ADULT - SUBJECTIVE AND OBJECTIVE BOX
Patient is a 58yo male with a PMH of HTN, HLD, CAD s/p PCIx3 stents, Bipolar disorder and failure to thrive for the past 6 months. He presented to the ED with approximately 1 year of abdominal distension that he reports has improved recently. Pt also reports gradual onset of weakness, dizziness and approximately 40lb weight loss during the past 6 months. He notes that he still has an appetite but does not have the energy to sometimes prepare or fully eat his meals. He has had flatus, no nausea or vomiting, and no abdominal pain. He reports that he has never received a colonoscopy and has not had any previous abdominal surgeries.  In ER patient was found to have sigmoid volvulus. patient is being admitted for further work up and treatment.    Allergies:  	No Known Allergies:    Home Medications:   * Patient Currently Takes Medications as of 2023 22:42 documented in Structured Notes  · 	Seroquel 25 mg oral tablet: Last Dose Taken:  , 1 orally once a day (at bedtime)  · 	lithium 450 mg oral tablet, extended release: Last Dose Taken:  , 1 orally once a day (at bedtime)  · 	Cytomel 50 mcg oral tablet: Last Dose Taken:  , 4 tab(s) orally once a day  · 	LaMICtal 200 mg oral tablet: Last Dose Taken:  , 1 orally once a day  · 	Seroquel 50 mg oral tablet: Last Dose Taken:  , 1 orally once a day (at bedtime)  · 	Caplyta 42 mg oral capsule: Last Dose Taken:  , 1 orally once a day    PAST MEDICAL HISTORY:  Anxiety     CAD (coronary artery disease)     Chronic constipation     HLD (hyperlipidemia)     HTN (hypertension)     Obesity     Prediabetes.     PAST SURGICAL HISTORY:  S/P coronary artery stent placement.    Social History:  · Substance use	Unable to obtain    Tobacco Screening:  · Core Measure Site	Yes  · Has the patient used tobacco in the past 30 days?	Patient declined to answer    Physical Exam:  · Constitutional	well-groomed; no distress  · Eyes	PERRL; EOMI; conjunctiva clear  · ENMT	no gross abnormalities  · Respiratory	clear to auscultation bilaterally; no wheezes; no rales; no rhonchi  · Cardiovascular	regular rate and rhythm; S1 S2 present; no gallops; no rub; no murmur  · Gastrointestinal	soft; nontender; no guarding; distended  · Neurological	sensation intact; responds to verbal commands; tremor present in B/L upper extremities  · Skin	warm and dry; color normal; no rashes  · Lymphatic	No lymphadedenopathy  · Psychiatric	alert and oriented x3; normal behavior; flat affect          141  |  110<H>  |  26<H>  ----------------------------<  121<H>  4.0   |  28  |  1.10    Ca    12.0<H>      10 Jul 2023 15:31    TPro  6.3  /  Alb  3.1<L>  /  TBili  0.6  /  DBili  x   /  AST  17  /  ALT  19  /  AlkPhos  121<H>                              11.3   4.78  )-----------( 183      ( 2023 23:55 )             34.9             LIVER FUNCTIONS - ( 2023 23:55 )  Alb: 3.1 g/dL / Pro: 6.3 g/dL / ALK PHOS: 121 U/L / ALT: 19 U/L / AST: 17 U/L / GGT: x             PT/INR - ( 2023 23:55 )   PT: 15.6 sec;   INR: 1.33 ratio         PTT - ( 2023 23:55 )  PTT:28.7 sec    Urinalysis Basic - ( 10 Jul 2023 03:13 )    Color: Yellow / Appearance: Clear / S.021 / pH: x  Gluc: x / Ketone: Negative mg/dL  / Bili: Negative / Urobili: 1.0 mg/dL   Blood: x / Protein: Negative mg/dL / Nitrite: Negative   Leuk Esterase: Negative / RBC: x / WBC x   Sq Epi: x / Non Sq Epi: x / Bacteria: x

## 2023-07-10 NOTE — ED ADULT NURSE REASSESSMENT NOTE - NS ED NURSE REASSESS COMMENT FT1
PO fluids given to patient. Pt ambulated to bathroom independently without incident. Awaiting inpatient bed assignment. will continue to monitor.

## 2023-07-10 NOTE — CONSULT NOTE ADULT - SUBJECTIVE AND OBJECTIVE BOX
SURGERY PA CONSULT NOTE:    CHIEF COMPLAINT:  Patient is a 57y old  Male who presents with a chief complaint of failure to thrive.    HPI FROM ED:  58 y/o male w/ PMH HTN, HLD, CAD s/p PCI x3 stents, Bipolar disorder p/w failure to thrive x6 months. Pt reports gradual onset in weakness of muscles, to the point where getting up from a chair/the toilet was difficult. Pt describes weakness being excessive to the point where it would take ~45 minutes to change his clothes in the morning. Pt also notes ~40 pound weight loss in this time frame. Pt reports preserved appetite, however notes not having the energy to fully eat all his meals. He also notes intermittent blood in his stools, although he reports that this does not frequently happen and there is minimal blood. Pt denies abdominal pain during this encounter. Pt endorses last BM  morning, denies flatus in last 24hrs. Last ate at 7PM.  Surgery consulted after CTAP revealed significantly distended sigmoid colon (15.5cm) w/ twisting of the mesentery and mild mesenteric edema.  Pt denies subjective fever, chills, N/V, change in bowel/urinary habits, abdominal pain, melena, or LOC.    PAST MEDICAL & SURGICAL HISTORY:  Chronic constipation    HLD (hyperlipidemia)    Obesity    Anxiety    Prediabetes    HTN (hypertension)    CAD (coronary artery disease)    S/P coronary artery stent placement    REVIEW OF SYSTEMS:  General/Constitutional: No acute distress, no headache, weakness, fevers, or chills   HEENT: Denies auditory or visual changes/disturbances, no vertigo, no throat pain, no dysphagia    Respiratory: Denies cough/hemoptysis, denies wheezing/SOB/dyspnea  Cardiac: Denies chest pain, palpitations  Abdomen: SEE HPI  Extremities: Denies sores, swelling, discoloration bilat UE/LE  Genitourinary: Denies urinary issues or complaints, denies dysuria/hematuria  Neuro: Denies weakness, paraesthesias, paralysis, syncope, loss of vision  Skin: Denies pruritus, pain, rashes  Psych: Denies hallucinations, visual disturbances, or depression    MEDICATIONS:  Home Medications:  Caplyta 42 mg oral capsule: 1 orally once a day (2023 22:40)  Cytomel 50 mcg oral tablet: 4 tab(s) orally once a day (2023 22:40)  LaMICtal 200 mg oral tablet: 1 orally once a day (2023 22:40)  lithium 450 mg oral tablet, extended release: 1 orally once a day (at bedtime) (2023 22:40)  Seroquel 25 mg oral tablet: 1 orally once a day (at bedtime) (2023 22:40)  Seroquel 50 mg oral tablet: 1 orally once a day (at bedtime) (2023 22:40)    ALLERGIES:  No Known Allergies    SOCIAL HISTORY:  Denies tobacco product, alcohol, or elicit drug use. Denies ever receiving a colonoscopy.    VITAL SIGNS:  Vital Signs Last 24 Hrs  T(C): 36.2 (10 Jul 2023 05:23), Max: 36.9 (2023 21:50)  T(F): 97.2 (10 Jul 2023 05:23), Max: 98.4 (2023 21:50)  HR: 90 (10 Jul 2023 05:23) (87 - 90)  BP: 135/81 (10 Jul 2023 05:23) (118/67 - 135/81)  BP(mean): --  RR: 16 (10 Jul 2023 05:23) (16 - 16)  SpO2: 97% (10 Jul 2023 05:23) (97% - 98%)    Parameters below as of 10 Jul 2023 05:23  Patient On (Oxygen Delivery Method): room air    PHYSICAL EXAM:  General: No acute distress, appears comfortable, well-groomed, appears stated age  Head, Eyes, Ears, Nose, Throat: Normal cephalic/atraumatic, anicteric, conjunctiva-non injected and moist, vision grossly intact, hearing grossly intact  Neck: Supple, trachea in the midline  Abdomen: Soft, distended, nontender, tympanitic No guarding or rebound tpp, no amy peritonitic findings.  Extremity: No swelling, or open sores, no gross deformities,  good range of motion  Neuro: Alert and oriented x3, motor and sensory intact  Skin: Good color, turgor, texture with no gross lesions, no eruptions, no rashes, no subcutaneous nodules and normal temperature.     LABS:                      11.3   4.78  )-----------( 183      ( 2023 23:55 )             34.9     07-09    141  |  110<H>  |  26<H>  ----------------------------<  121<H>  4.0   |  28  |  1.10    Ca    11.9<H>      2023 23:55    TPro  6.3  /  Alb  3.1<L>  /  TBili  0.6  /  DBili  x   /  AST  17  /  ALT  19  /  AlkPhos  121<H>      PT/INR - ( 2023 23:55 )   PT: 15.6 sec;   INR: 1.33 ratio         PTT - ( 2023 23:55 )  PTT:28.7 sec  Urinalysis Basic - ( 10 Jul 2023 03:13 )    Color: Yellow / Appearance: Clear / S.021 / pH: x  Gluc: x / Ketone: Negative mg/dL  / Bili: Negative / Urobili: 1.0 mg/dL   Blood: x / Protein: Negative mg/dL / Nitrite: Negative   Leuk Esterase: Negative / RBC: x / WBC x   Sq Epi: x / Non Sq Epi: x / Bacteria: x    LIVER FUNCTIONS - ( 2023 23:55 )  Alb: 3.1 g/dL / Pro: 6.3 g/dL / ALK PHOS: 121 U/L / ALT: 19 U/L / AST: 17 U/L / GGT: x           RADIOLOGY & ADDITIONAL STUDIES:  ACC: [id number] EXAM:  CT ABDOMEN AND PELVIS IC   ORDERED BY: BROOKE VALENCIA   ACC: [id number] EXAM:  CT CHEST IC   ORDERED BY: BROOKE VALENCIA   PROCEDURE DATE:  07/10/2023      FINDINGS:  CHEST:  LUNGS AND LARGE AIRWAYS: Patent central airways. 1.2 cm left lower lobe   nodule versus focus of atelectasis (2:58). Recommend follow-up   noncontrast chest CT in 3 months.  PLEURA: No pleural effusion.  VESSELS: Within normal limits.  HEART: Heart size is normal. No pericardial effusion. Coronary artery and   aortic valve calcifications.  MEDIASTINUM AND CHRISTINE: No lymphadenopathy. There is a 4.9 x 3.3 cm   well-circumscribed fluid collection in the right cardiophrenic fat (601:8   5). This is unchanged compared to the previous CT of 3/16/2021. This may   represent a pericardial cyst or lymphangioma or other cystic structure.   If further evaluation is needed dedicated chest MRI can be obtained.  CHEST WALL AND LOWER NECK: 1.7 cm right lobe thyroid nodule. Nonemergent   thyroid ultrasound is recommended.    ABDOMEN AND PELVIS:  LIVER: Within normal limits.  BILE DUCTS: Normal caliber.  GALLBLADDER: Within normal limits.  SPLEEN: Splenomegaly. Spleen ixipczna02.1 cm, previously 15.2 cm.  PANCREAS: Within normal limits.  ADRENALS: Within normal limits.  KIDNEYS/URETERS: Bilateral renal cortical hypodensities too small to   characterize. 1.5 cm indeterminant left renal cortical cyst possibly a   proteinaceous or hemorrhagic cyst. Nonemergent renal ultrasound is   recommended.    BLADDER: Within normal limits.  REPRODUCTIVE ORGANS: Prostate within normal limits.    BOWEL: The sigmoid colon is markedly air distended measuring up to 15.5   cm. There is twisting of the mesentery best seen on (601:38-48). There is   no colonic wall thickening or pneumatosis at this time. There is mild   mesenteric edema. There is no significant obstruction proximally.  Thickening of the distal esophagus. This would be better evaluated with   endoscopy.  Normal appendix.    PERITONEUM: No ascites.  VESSELS: Atherosclerotic changes.  RETROPERITONEUM/LYMPH NODES: No lymphadenopathy.  ABDOMINAL WALL: Within normal limits.  BONES: Degenerative changes.    IMPRESSION:    The sigmoid colon is markedly air distended measuring up to 15.5 cm.   There is twisting of the mesentery best seen on (601:38-48). There is no   colonic wall thickening or pneumatosis at this time. There is mild   mesenteric edema. There is no significant obstruction proximally.   Recommend surgical consultation.      1.2 cm left lower lobe nodule versus focus of atelectasis (2:58).   Recommend follow-up noncontrast chest CT in 3 months.    1.7 cm right lobe thyroid nodule. Nonemergent thyroid ultrasound is   recommended.    There is a 4.9 x 3.3 cm well-circumscribed fluid collection in the right   cardiophrenic fat (601:8 5). This is unchanged compared to the previous   CT of 3/16/2021. This may represent a pericardial cyst or lymphangioma or   other cystic structure. If further evaluation is needed dedicated chest   MRI can be obtained.    Splenomegaly. Spleen measures 16.1 cm, previously 15.2 cm.    Thickening of the distal esophagus. This would be better evaluated with   endoscopy.    1.5 cm indeterminant left renal cortical cyst possibly a proteinaceous or   hemorrhagic cyst. Previously subcentimeter in size. Nonemergent renal   ultrasound is recommended.    --- End of Report ---  JACK MEYER MD; Attending Radiologist    ASSESSMENT:  58 y/o male w/ PMH HTN, HLD, CAD s/p PCI x3 stents, Bipolar disorder p/w failure to thrive x6 months w/ ~40 pound weight loss in that time. General surgery consulted after CTAP revealed significantly distended sigmoid colon with twisting of the mesentery and mesenteric edema. Question of sigmoid volvulus?  In spite of this, pt remains without abdominal complaints, with an exam which reveals distension without appreciable tenderness.  VSS, labs somewhat reassuring.    PLAN:    - NPO  - GI consult  - Add-on lactate to blood work  - pain control, supportive care  - No emergent surgical intervention at this time however pt would benefit from STAT GI consultation SURGERY PA CONSULT NOTE:    CHIEF COMPLAINT:  Patient is a 57y old  Male who presents with a chief complaint of failure to thrive.    HPI FROM ED:  58 y/o male w/ PMH HTN, HLD, CAD s/p PCI x3 stents, Bipolar disorder p/w failure to thrive x6 months. Pt reports gradual onset in weakness of muscles, to the point where getting up from a chair/the toilet was difficult. Pt describes weakness being excessive to the point where it would take ~45 minutes to change his clothes in the morning. Pt also notes ~40 pound weight loss in this time frame. Pt reports preserved appetite, however notes not having the energy to fully eat all his meals. He also notes intermittent blood in his stools, although he reports that this does not frequently happen and there is minimal blood. Pt denies abdominal pain during this encounter. Pt endorses last BM  morning, denies flatus in last 24hrs. Last ate at 7PM.  Surgery consulted after CTAP revealed significantly distended sigmoid colon (15.5cm) w/ twisting of the mesentery and mild mesenteric edema.  Pt denies subjective fever, chills, N/V, change in bowel/urinary habits, abdominal pain, melena, or LOC.    PAST MEDICAL & SURGICAL HISTORY:  Chronic constipation    HLD (hyperlipidemia)    Obesity    Anxiety    Prediabetes    HTN (hypertension)    CAD (coronary artery disease)    S/P coronary artery stent placement    REVIEW OF SYSTEMS:  General/Constitutional: No acute distress, no headache, weakness, fevers, or chills   HEENT: Denies auditory or visual changes/disturbances, no vertigo, no throat pain, no dysphagia    Respiratory: Denies cough/hemoptysis, denies wheezing/SOB/dyspnea  Cardiac: Denies chest pain, palpitations  Abdomen: SEE HPI  Extremities: Denies sores, swelling, discoloration bilat UE/LE  Genitourinary: Denies urinary issues or complaints, denies dysuria/hematuria  Neuro: Denies weakness, paraesthesias, paralysis, syncope, loss of vision  Skin: Denies pruritus, pain, rashes  Psych: Denies hallucinations, visual disturbances, or depression    MEDICATIONS:  Home Medications:  Caplyta 42 mg oral capsule: 1 orally once a day (2023 22:40)  Cytomel 50 mcg oral tablet: 4 tab(s) orally once a day (2023 22:40)  LaMICtal 200 mg oral tablet: 1 orally once a day (2023 22:40)  lithium 450 mg oral tablet, extended release: 1 orally once a day (at bedtime) (2023 22:40)  Seroquel 25 mg oral tablet: 1 orally once a day (at bedtime) (2023 22:40)  Seroquel 50 mg oral tablet: 1 orally once a day (at bedtime) (2023 22:40)    ALLERGIES:  No Known Allergies    SOCIAL HISTORY:  Denies tobacco product, alcohol, or elicit drug use. Denies ever receiving a colonoscopy.    VITAL SIGNS:  Vital Signs Last 24 Hrs  T(C): 36.2 (10 Jul 2023 05:23), Max: 36.9 (2023 21:50)  T(F): 97.2 (10 Jul 2023 05:23), Max: 98.4 (2023 21:50)  HR: 90 (10 Jul 2023 05:23) (87 - 90)  BP: 135/81 (10 Jul 2023 05:23) (118/67 - 135/81)  BP(mean): --  RR: 16 (10 Jul 2023 05:23) (16 - 16)  SpO2: 97% (10 Jul 2023 05:23) (97% - 98%)    Parameters below as of 10 Jul 2023 05:23  Patient On (Oxygen Delivery Method): room air    PHYSICAL EXAM:  General: No acute distress, appears comfortable, well-groomed, appears stated age  Head, Eyes, Ears, Nose, Throat: Normal cephalic/atraumatic, anicteric, conjunctiva-non injected and moist, vision grossly intact, hearing grossly intact  Neck: Supple, trachea in the midline  Abdomen: Soft, distended, nontender, tympanitic No guarding or rebound tpp, no amy peritonitic findings.  Extremity: No swelling, or open sores, no gross deformities,  good range of motion  Neuro: Alert and oriented x3, motor and sensory intact  Skin: Good color, turgor, texture with no gross lesions, no eruptions, no rashes, no subcutaneous nodules and normal temperature.     LABS:                      11.3   4.78  )-----------( 183      ( 2023 23:55 )             34.9     07-09    141  |  110<H>  |  26<H>  ----------------------------<  121<H>  4.0   |  28  |  1.10    Ca    11.9<H>      2023 23:55    TPro  6.3  /  Alb  3.1<L>  /  TBili  0.6  /  DBili  x   /  AST  17  /  ALT  19  /  AlkPhos  121<H>      PT/INR - ( 2023 23:55 )   PT: 15.6 sec;   INR: 1.33 ratio         PTT - ( 2023 23:55 )  PTT:28.7 sec  Urinalysis Basic - ( 10 Jul 2023 03:13 )    Color: Yellow / Appearance: Clear / S.021 / pH: x  Gluc: x / Ketone: Negative mg/dL  / Bili: Negative / Urobili: 1.0 mg/dL   Blood: x / Protein: Negative mg/dL / Nitrite: Negative   Leuk Esterase: Negative / RBC: x / WBC x   Sq Epi: x / Non Sq Epi: x / Bacteria: x    LIVER FUNCTIONS - ( 2023 23:55 )  Alb: 3.1 g/dL / Pro: 6.3 g/dL / ALK PHOS: 121 U/L / ALT: 19 U/L / AST: 17 U/L / GGT: x           RADIOLOGY & ADDITIONAL STUDIES:  ACC: [id number] EXAM:  CT ABDOMEN AND PELVIS IC   ORDERED BY: BROOKE VALENCIA   ACC: [id number] EXAM:  CT CHEST IC   ORDERED BY: BROOKE AVLENCIA   PROCEDURE DATE:  07/10/2023      FINDINGS:  CHEST:  LUNGS AND LARGE AIRWAYS: Patent central airways. 1.2 cm left lower lobe   nodule versus focus of atelectasis (2:58). Recommend follow-up   noncontrast chest CT in 3 months.  PLEURA: No pleural effusion.  VESSELS: Within normal limits.  HEART: Heart size is normal. No pericardial effusion. Coronary artery and   aortic valve calcifications.  MEDIASTINUM AND CHRISTINE: No lymphadenopathy. There is a 4.9 x 3.3 cm   well-circumscribed fluid collection in the right cardiophrenic fat (601:8   5). This is unchanged compared to the previous CT of 3/16/2021. This may   represent a pericardial cyst or lymphangioma or other cystic structure.   If further evaluation is needed dedicated chest MRI can be obtained.  CHEST WALL AND LOWER NECK: 1.7 cm right lobe thyroid nodule. Nonemergent   thyroid ultrasound is recommended.    ABDOMEN AND PELVIS:  LIVER: Within normal limits.  BILE DUCTS: Normal caliber.  GALLBLADDER: Within normal limits.  SPLEEN: Splenomegaly. Spleen hupenyhv87.1 cm, previously 15.2 cm.  PANCREAS: Within normal limits.  ADRENALS: Within normal limits.  KIDNEYS/URETERS: Bilateral renal cortical hypodensities too small to   characterize. 1.5 cm indeterminant left renal cortical cyst possibly a   proteinaceous or hemorrhagic cyst. Nonemergent renal ultrasound is   recommended.    BLADDER: Within normal limits.  REPRODUCTIVE ORGANS: Prostate within normal limits.    BOWEL: The sigmoid colon is markedly air distended measuring up to 15.5   cm. There is twisting of the mesentery best seen on (601:38-48). There is   no colonic wall thickening or pneumatosis at this time. There is mild   mesenteric edema. There is no significant obstruction proximally.  Thickening of the distal esophagus. This would be better evaluated with   endoscopy.  Normal appendix.    PERITONEUM: No ascites.  VESSELS: Atherosclerotic changes.  RETROPERITONEUM/LYMPH NODES: No lymphadenopathy.  ABDOMINAL WALL: Within normal limits.  BONES: Degenerative changes.    IMPRESSION:    The sigmoid colon is markedly air distended measuring up to 15.5 cm.   There is twisting of the mesentery best seen on (601:38-48). There is no   colonic wall thickening or pneumatosis at this time. There is mild   mesenteric edema. There is no significant obstruction proximally.   Recommend surgical consultation.      1.2 cm left lower lobe nodule versus focus of atelectasis (2:58).   Recommend follow-up noncontrast chest CT in 3 months.    1.7 cm right lobe thyroid nodule. Nonemergent thyroid ultrasound is   recommended.    There is a 4.9 x 3.3 cm well-circumscribed fluid collection in the right   cardiophrenic fat (601:8 5). This is unchanged compared to the previous   CT of 3/16/2021. This may represent a pericardial cyst or lymphangioma or   other cystic structure. If further evaluation is needed dedicated chest   MRI can be obtained.    Splenomegaly. Spleen measures 16.1 cm, previously 15.2 cm.    Thickening of the distal esophagus. This would be better evaluated with   endoscopy.    1.5 cm indeterminant left renal cortical cyst possibly a proteinaceous or   hemorrhagic cyst. Previously subcentimeter in size. Nonemergent renal   ultrasound is recommended.    --- End of Report ---  JACK MEYER MD; Attending Radiologist

## 2023-07-10 NOTE — ED ADULT NURSE NOTE - OBJECTIVE STATEMENT
57 yr old male c/o abdominal pain, nausea without vomiting, decreased appetite and failure to thrive (weakness) x 3 days. A+O x 4. Family at the bedside. Abdomen soft and non tender but distended. Pt reports he has had mild nausea x 3 days without vomiting. Denies vomiting, fever. Reports blood streaked stools. Denies CP, palpitations, back pain, fever, sob, diarrhea, constipation. Denies recent falls. PMH of CAD, bipolar, HTN, and pre-diabetes. VSS on admission to ED. + BS in all quadrants. S1/S2. Lungs clear bilaterally. + BS in all quadrants noted. Tympanic BS noted on assessment. skin warm dry and intact. Pt is independently ambulatory and gait is steady. will continue to monitor.

## 2023-07-10 NOTE — CONSULT NOTE ADULT - SUBJECTIVE AND OBJECTIVE BOX
[facility]  Umang Nelson PA-C  [address] [place] [id number] [phone]      Chief Complaint:  Patient is a 57y old  Male who presents with a chief complaint of     HPI:56 y/o male w/ PMH HTN, HLD, CAD s/p PCI x3 stents, Bipolar disorder p/w failure to thrive x6 months. Pt reports gradual onset in weakness of muscles, to the point where getting up from a chair/the toilet was difficult. Pt describes weakness being excessive to the point where it would take ~45 minutes to change his clothes in the morning. Pt also notes ~40 pound weight loss in this time frame. Pt reports preserved appetite, however notes not having the energy to fully eat all his meals. He also notes intermittent blood in his stools, although he reports that this does not frequently happen and there is minimal blood. Pt denies abdominal pain during this encounter.   Allergies:  No Known Allergies      Medications:  sorbitol 70%/mineral oil/magnesium hydroxide/glycerin Enema 120 milliLiter(s) Rectal Once      PMHX/PSHX:  Chronic constipation    HLD (hyperlipidemia)    Obesity    Anxiety    Prediabetes    HTN (hypertension)    CAD (coronary artery disease)    S/P coronary artery stent placement        Family history:      Social History:     ROS:     General:  no fevers, chills, night sweats, fatigue,   Eyes:  Good vision, no reported pain  ENT:  No sore throat, pain, runny nose, dysphagia  CV:  No pain, palpitations, hypo/hypertension  Resp:  No dyspnea, cough, tachypnea, wheezing  GI:  No pain, No nausea, No vomiting, No diarrhea, No constipation, No weight loss, No fever, No pruritis, No rectal bleeding, No tarry stools, No dysphagia,  :  No pain, bleeding, incontinence, nocturia  Muscle:  No pain, weakness  Neuro:  No weakness, tingling, memory problems  Psych:  No fatigue, insomnia, mood problems, depression  Endocrine:  No polyuria, polydipsia, cold/heat intolerance  Heme:  No petechiae, ecchymosis, easy bruisability  Skin:  No rash, tattoos, scars, edema      PHYSICAL EXAM:   Vital Signs:  Vital Signs Last 24 Hrs  T(C): 36.2 (10 Jul 2023 05:23), Max: 36.9 (2023 21:50)  T(F): 97.2 (10 Jul 2023 05:23), Max: 98.4 (2023 21:50)  HR: 90 (10 Jul 2023 05:23) (87 - 90)  BP: 135/81 (10 Jul 2023 05:23) (118/67 - 135/81)  BP(mean): --  RR: 16 (10 Jul 2023 05:23) (16 - 16)  SpO2: 97% (10 Jul 2023 05:23) (97% - 98%)    Parameters below as of 10 Jul 2023 05:23  Patient On (Oxygen Delivery Method): room air      Daily Height in cm: 180.34 (2023 21:50)    Daily     GENERAL:  Appears stated age,   HEENT:  NC/AT,    CHEST:  Full & symmetric excursion,   HEART:  Regular rhythm  ABDOMEN:  Soft, non-tender, softly distended,   EXTEREMITIES:  no cyanosis,clubbing or edema  SKIN:  No rash  NEURO:  Alert,    LABS:                        11.3   4.78  )-----------( 183      ( 2023 23:55 )             34.9     07    141  |  110<H>  |  26<H>  ----------------------------<  121<H>  4.0   |  28  |  1.10    Ca    11.9<H>      2023 23:55    TPro  6.3  /  Alb  3.1<L>  /  TBili  0.6  /  DBili  x   /  AST  17  /  ALT  19  /  AlkPhos  121<H>  07    LIVER FUNCTIONS - ( 2023 23:55 )  Alb: 3.1 g/dL / Pro: 6.3 g/dL / ALK PHOS: 121 U/L / ALT: 19 U/L / AST: 17 U/L / GGT: x           PT/INR - ( 2023 23:55 )   PT: 15.6 sec;   INR: 1.33 ratio         PTT - ( 2023 23:55 )  PTT:28.7 sec  Urinalysis Basic - ( 10 Jul 2023 03:13 )    Color: Yellow / Appearance: Clear / S.021 / pH: x  Gluc: x / Ketone: Negative mg/dL  / Bili: Negative / Urobili: 1.0 mg/dL   Blood: x / Protein: Negative mg/dL / Nitrite: Negative   Leuk Esterase: Negative / RBC: x / WBC x   Sq Epi: x / Non Sq Epi: x / Bacteria: x      Amylase Serum29      Lipase serum85       Ammonia--      Imaging:

## 2023-07-10 NOTE — CONSULT NOTE ADULT - ATTENDING COMMENTS
No signs of significant ischemia or volume overload. Currently no active cardiac conditions. No signs of ischemia, ADHF, clinical exam not consistent with severe stenotic valvular disease, no unstable arrhythmias noted. Therefore able to proceed with this urgent low risk endoscopic GI procedure  without any further cardiac workup. Routine hemodynamic monitoring is suggested during the procedure. May need to start on ASA if possible.

## 2023-07-10 NOTE — CONSULT NOTE ADULT - SUBJECTIVE AND OBJECTIVE BOX
[facility]          [place], Dany Clark Savella        [phone] (office)    CHIEF COMPLAINT: Patient is a 57y old  Male who presents with a chief complaint of 1 year of abdominal distension (10 Jul 2023 15:36)      HPI:  Patient is a 56yo male with a PMH of HTN, HLD, CAD s/p PCIx3 stents, Bipolar disorder and failure to thrive for the past 6 months. He presented to the ED with approximately 1 year of abdominal distension that he reports has improved recently. Pt also reports gradual onset of weakness, dizziness and approximately 40lb weight loss during the past 6 months. He notes that he still has an appetite but does not have the energy to sometimes prepare or fully eat his meals. He has had flatus, no nausea or vomiting, and no abdominal pain. He reports that he has never received a colonoscopy and has not had any previous abdominal surgeries.  (10 Jul 2023 15:36)      PAST MEDICAL & SURGICAL HISTORY:  Chronic constipation      HLD (hyperlipidemia)      Obesity      Anxiety      Prediabetes      HTN (hypertension)      CAD (coronary artery disease)      S/P coronary artery stent placement          SOCIAL HISTORY: No active tobacco, alcohol or illicit drug use.    FAMILY HISTORY:      Home Medications:      MEDICATIONS  (STANDING):  amLODIPine   Tablet 2.5 milliGRAM(s) Oral every 24 hours  bisacodyl 10 milliGRAM(s) Oral every 4 hours  heparin   Injectable 5000 Unit(s) SubCutaneous every 12 hours  lamoTRIgine 200 milliGRAM(s) Oral daily  liothyronine 200 MICROGram(s) Oral daily  lithium CR (ESKALITH-CR) 450 milliGRAM(s) Oral at bedtime  polyethylene glycol/electrolyte Solution 1000 milliLiter(s) Oral every 4 hours  QUEtiapine 50 milliGRAM(s) Oral at bedtime    MEDICATIONS  (PRN):  ondansetron Injectable 4 milliGRAM(s) IV Push four times a day PRN Nausea and/or Vomiting      Allergies    No Known Allergies    Intolerances        REVIEW OF SYSTEMS: denies chest pain, palpitations, SOB, dyspnea, PND, orthopnea, near syncope, syncope, or lower extremity edema.    VITAL SIGNS:   Vital Signs Last 24 Hrs  T(C): 37 (10 Jul 2023 15:05), Max: 37 (10 Jul 2023 15:05)  T(F): 98.6 (10 Jul 2023 15:05), Max: 98.6 (10 Jul 2023 15:05)  HR: 87 (10 Jul 2023 15:05) (87 - 90)  BP: 176/83 (10 Jul 2023 15:05) (118/67 - 176/83)  BP(mean): --  RR: 18 (10 Jul 2023 15:05) (16 - 18)  SpO2: 96% (10 Jul 2023 15:05) (96% - 98%)    Parameters below as of 10 Jul 2023 15:05  Patient On (Oxygen Delivery Method): room air        I&O's Summary      PHYSICAL EXAM:  Constitutional: NAD, awake and alert, well-developed  Eyes: EOMI, no oral cyanosis, conjunctivae clear, anicteric  Pulmonary: Non-labored, breath sounds are clear bilaterally, no wheezing, rales or rhonchi  Cardiovascular: Irregular S1 and S2, in af, tachycardic normal rate. No murmur, rubs, gallops or clicks  Gastrointestinal: Bowel Sounds present, soft, nontender  Lymph: No peripheral edema   Neurological: Alert, strength and sensitivity are grossly intact  Skin: Warm, well-perfused  Psych: Mood & affect appropriate    LABS: All Labs Reviewed:                        11.3   4.78  )-----------( 183      ( 09 Jul 2023 23:55 )             34.9     09 Jul 2023 23:55    141    |  110    |  26     ----------------------------<  121    4.0     |  28     |  1.10     Ca    11.9       09 Jul 2023 23:55    TPro  6.3    /  Alb  3.1    /  TBili  0.6    /  DBili  x      /  AST  17     /  ALT  19     /  AlkPhos  121    09 Jul 2023 23:55    PT/INR - ( 09 Jul 2023 23:55 )   PT: 15.6 sec;   INR: 1.33 ratio         PTT - ( 09 Jul 2023 23:55 )  PTT:28.7 sec      Blood Culture:         RADIOLOGY:    EKG: sinus rhythm with 1st degree av block  [facility]          [place], Dany Clark Savella        [phone] (office)    CHIEF COMPLAINT: Patient is a 57y old  Male who presents with a chief complaint of 1 year of abdominal distension (10 Jul 2023 15:36)      HPI:  Patient is a 56yo male with a PMH of HTN, HLD, CAD s/p PCIx3 stents, Bipolar disorder and failure to thrive for the past 6 months. He presented to the ED with approximately 1 year of abdominal distension that he reports has improved recently. Pt also reports gradual onset of weakness, dizziness and approximately 40lb weight loss during the past 6 months. He notes that he still has an appetite but does not have the energy to sometimes prepare or fully eat his meals. He has had flatus, no nausea or vomiting, and no abdominal pain. He reports that he has never received a colonoscopy and has not had any previous abdominal surgeries.  (10 Jul 2023 15:36)      PAST MEDICAL & SURGICAL HISTORY:  Chronic constipation      HLD (hyperlipidemia)      Obesity      Anxiety      Prediabetes      HTN (hypertension)      CAD (coronary artery disease)      S/P coronary artery stent placement          SOCIAL HISTORY: No active tobacco, alcohol or illicit drug use.    FAMILY HISTORY:      Home Medications:      MEDICATIONS  (STANDING):  amLODIPine   Tablet 2.5 milliGRAM(s) Oral every 24 hours  bisacodyl 10 milliGRAM(s) Oral every 4 hours  heparin   Injectable 5000 Unit(s) SubCutaneous every 12 hours  lamoTRIgine 200 milliGRAM(s) Oral daily  liothyronine 200 MICROGram(s) Oral daily  lithium CR (ESKALITH-CR) 450 milliGRAM(s) Oral at bedtime  polyethylene glycol/electrolyte Solution 1000 milliLiter(s) Oral every 4 hours  QUEtiapine 50 milliGRAM(s) Oral at bedtime    MEDICATIONS  (PRN):  ondansetron Injectable 4 milliGRAM(s) IV Push four times a day PRN Nausea and/or Vomiting      Allergies    No Known Allergies    Intolerances        REVIEW OF SYSTEMS: denies chest pain, palpitations, SOB, dyspnea, PND, orthopnea, near syncope, syncope, or lower extremity edema.    VITAL SIGNS:   Vital Signs Last 24 Hrs  T(C): 37 (10 Jul 2023 15:05), Max: 37 (10 Jul 2023 15:05)  T(F): 98.6 (10 Jul 2023 15:05), Max: 98.6 (10 Jul 2023 15:05)  HR: 87 (10 Jul 2023 15:05) (87 - 90)  BP: 176/83 (10 Jul 2023 15:05) (118/67 - 176/83)  BP(mean): --  RR: 18 (10 Jul 2023 15:05) (16 - 18)  SpO2: 96% (10 Jul 2023 15:05) (96% - 98%)    Parameters below as of 10 Jul 2023 15:05  Patient On (Oxygen Delivery Method): room air        I&O's Summary      PHYSICAL EXAM:  Constitutional: NAD, awake and alert, well-developed  Eyes: EOMI, no oral cyanosis, conjunctivae clear, anicteric  Pulmonary: Non-labored, breath sounds are clear bilaterally, no wheezing, rales or rhonchi  Cardiovascular: RRR S1 and S2 SM   Gastrointestinal: Bowel Sounds present, soft, nontender  Lymph: No peripheral edema   Neurological: Alert, strength and sensitivity are grossly intact  Skin: Warm, well-perfused  Psych: Mood & affect appropriate    LABS: All Labs Reviewed:                        11.3   4.78  )-----------( 183      ( 09 Jul 2023 23:55 )             34.9     09 Jul 2023 23:55    141    |  110    |  26     ----------------------------<  121    4.0     |  28     |  1.10     Ca    11.9       09 Jul 2023 23:55    TPro  6.3    /  Alb  3.1    /  TBili  0.6    /  DBili  x      /  AST  17     /  ALT  19     /  AlkPhos  121    09 Jul 2023 23:55    PT/INR - ( 09 Jul 2023 23:55 )   PT: 15.6 sec;   INR: 1.33 ratio         PTT - ( 09 Jul 2023 23:55 )  PTT:28.7 sec      Blood Culture:         RADIOLOGY:    EKG: sinus rhythm with 1st degree av block

## 2023-07-10 NOTE — ED ADULT NURSE REASSESSMENT NOTE - NS ED NURSE REASSESS COMMENT FT1
Pt refusing bowel prep- Moviprep, dulcolax, enema's. Surgical PA and RN Charge made aware. will continue to monitor.

## 2023-07-10 NOTE — CONSULT NOTE ADULT - ASSESSMENT
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 He underwent TARE with Yttrium-90 on 10/06/2016. \par \par #Transaminitis\par #Liver Cirrhosis\par #HCC\par - Liver Cirrhosis likely secondary to HCV infection\par - S/p TARE with Yttrium-90 on 10/06/2016. 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 No longer taking aspirin. \par -Following with heme/onc\par - Other work up for CLD is negative except for: ESTELLE (1:640) and Antismooth -muscle antibodies ( 1:20)\par -Started following with hepatology clinic, needs to go back, told to make appointment \par - given first Hep A and B vaccine today 2021\par - Stable Liver Cirrhosis ( No Ascites/SBP, No esophageal variceal bleeding, no hepatic encephalopathy)\par - EGD in 2021 shows some varices and non-erosive gastritis, repeat in a year \par - c/w Propranolol 10mg po q8hrs\par - Patient with Cirrhosis, keep MAP > 85mmHg \par - Repeat CT scan Abdomen/pelvis done on 2021: showed stable disease \par - The patient does not want  the liver transplant anymore, does not want to travel to the city \par \par #Type II DM: \par - HbA1c: 9.9%, started on jardiance and ozempic, did not repeat labs  \par -has also been taking metformin\par - Pt needs to see optometry \par - Podiatry follow up: Last seen in 2021, ok. Will need to follow up in 12 months ( 2022)\par - Renal function: 0.7 and K+:4.0 --> started on Lisinopril 5mg po once daily. - Patient with Cirrhosis, keep MAP > 85mmHg.\par \par #Dyslipidemia: \par - T Total Chol: 171 HDL: 40 LDL: 110\par - ASCVD score: 21.52%\par - holding statin as per GI in the setting of liver disease\par \par #Smoker: \par - Active smoker, still smoking half a pack/day\par - educated on smoking cessation. \par - was prescribed Chantix, was not taking it, encouraged to take to help quit smoking\par -pharmacist referral  \par \par #HCM: \par -labs today \par - CT chest in 2021 showed no lung masses, will put for repeat for lung  cancer screening \par - Colonoscopy: Done in . S/p polypectomy. Repeat in .\par - EGD in 2021, will need repeat in a year \par - The patient has Cirrhosis, took his Pneumovax aug 2020\par -Needs to make appointment for hepatology clinic \par -flu vaccine today\par -hep a and b first shot today \par -f/u with pharmacist in a month \par -RTC in 3 months  ASSESSMENT:  58 y/o male w/ PMH HTN, HLD, CAD s/p PCI x3 stents, Bipolar disorder p/w failure to thrive x6 months w/ ~40 pound weight loss in that time. General surgery consulted after CTAP revealed significantly distended sigmoid colon with twisting of the mesentery and mesenteric edema. Question of sigmoid volvulus?  In spite of this, pt remains without abdominal complaints, with an exam which reveals distension without appreciable tenderness.  VSS, labs somewhat reassuring.    PLAN:    - NPO  - GI contacted/consult for immediate decompression  - Add-on lactate to blood work  - pain control, supportive care  - No emergent surgical intervention at this time however pt would benefit from STAT GI consultation

## 2023-07-10 NOTE — H&P ADULT - HISTORY OF PRESENT ILLNESS
Patient is a 56yo male with a PMH of HTN, HLD, CAD s/p PCIx3 stents, Bipolar disorder and failure to thrive for the past 6 months. He presented to the ED with approximately 1 year of abdominal distension that he reports has improved recently. Pt also reports gradual onset of weakness, dizziness and approximately 40lb weight loss during the past 6 months. He notes that he still has an appetite but does not have the energy to sometimes prepare or fully eat his meals. He has had flatus, no nausea or vomiting, and no abdominal pain. He reports that he has never received a colonoscopy and has not had any previous abdominal surgeries.

## 2023-07-11 LAB
A1C WITH ESTIMATED AVERAGE GLUCOSE RESULT: 5.2 % — SIGNIFICANT CHANGE UP (ref 4–5.6)
ANION GAP SERPL CALC-SCNC: 5 MMOL/L — SIGNIFICANT CHANGE UP (ref 5–17)
BUN SERPL-MCNC: 21 MG/DL — SIGNIFICANT CHANGE UP (ref 7–23)
CALCIUM SERPL-MCNC: 11.7 MG/DL — HIGH (ref 8.5–10.1)
CHLORIDE SERPL-SCNC: 113 MMOL/L — HIGH (ref 96–108)
CO2 SERPL-SCNC: 24 MMOL/L — SIGNIFICANT CHANGE UP (ref 22–31)
CREAT SERPL-MCNC: 0.71 MG/DL — SIGNIFICANT CHANGE UP (ref 0.5–1.3)
EGFR: 107 ML/MIN/1.73M2 — SIGNIFICANT CHANGE UP
ESTIMATED AVERAGE GLUCOSE: 103 MG/DL — SIGNIFICANT CHANGE UP (ref 68–114)
GLUCOSE SERPL-MCNC: 66 MG/DL — LOW (ref 70–99)
HCT VFR BLD CALC: 35.8 % — LOW (ref 39–50)
HCV AB S/CO SERPL IA: 0.09 S/CO — SIGNIFICANT CHANGE UP (ref 0–0.99)
HCV AB SERPL-IMP: SIGNIFICANT CHANGE UP
HGB BLD-MCNC: 11.6 G/DL — LOW (ref 13–17)
MAGNESIUM SERPL-MCNC: 1.8 MG/DL — SIGNIFICANT CHANGE UP (ref 1.6–2.6)
MCHC RBC-ENTMCNC: 26.8 PG — LOW (ref 27–34)
MCHC RBC-ENTMCNC: 32.4 GM/DL — SIGNIFICANT CHANGE UP (ref 32–36)
MCV RBC AUTO: 82.7 FL — SIGNIFICANT CHANGE UP (ref 80–100)
NRBC # BLD: 0 /100 WBCS — SIGNIFICANT CHANGE UP (ref 0–0)
PHOSPHATE SERPL-MCNC: 2.2 MG/DL — LOW (ref 2.5–4.5)
PLATELET # BLD AUTO: 179 K/UL — SIGNIFICANT CHANGE UP (ref 150–400)
POTASSIUM SERPL-MCNC: 3.9 MMOL/L — SIGNIFICANT CHANGE UP (ref 3.5–5.3)
POTASSIUM SERPL-SCNC: 3.9 MMOL/L — SIGNIFICANT CHANGE UP (ref 3.5–5.3)
RBC # BLD: 4.33 M/UL — SIGNIFICANT CHANGE UP (ref 4.2–5.8)
RBC # FLD: 14.1 % — SIGNIFICANT CHANGE UP (ref 10.3–14.5)
SODIUM SERPL-SCNC: 142 MMOL/L — SIGNIFICANT CHANGE UP (ref 135–145)
WBC # BLD: 6.2 K/UL — SIGNIFICANT CHANGE UP (ref 3.8–10.5)
WBC # FLD AUTO: 6.2 K/UL — SIGNIFICANT CHANGE UP (ref 3.8–10.5)

## 2023-07-11 PROCEDURE — 76536 US EXAM OF HEAD AND NECK: CPT | Mod: 26

## 2023-07-11 PROCEDURE — 76775 US EXAM ABDO BACK WALL LIM: CPT | Mod: 26

## 2023-07-11 PROCEDURE — 99232 SBSQ HOSP IP/OBS MODERATE 35: CPT

## 2023-07-11 RX ORDER — SOD SULF/SODIUM/NAHCO3/KCL/PEG
1000 SOLUTION, RECONSTITUTED, ORAL ORAL EVERY 4 HOURS
Refills: 0 | Status: COMPLETED | OUTPATIENT
Start: 2023-07-11 | End: 2023-07-11

## 2023-07-11 RX ORDER — ACETAMINOPHEN 500 MG
650 TABLET ORAL EVERY 6 HOURS
Refills: 0 | Status: DISCONTINUED | OUTPATIENT
Start: 2023-07-11 | End: 2023-07-13

## 2023-07-11 RX ORDER — AMLODIPINE BESYLATE 2.5 MG/1
10 TABLET ORAL EVERY 24 HOURS
Refills: 0 | Status: DISCONTINUED | OUTPATIENT
Start: 2023-07-11 | End: 2023-07-13

## 2023-07-11 RX ORDER — POTASSIUM PHOSPHATE, MONOBASIC POTASSIUM PHOSPHATE, DIBASIC 236; 224 MG/ML; MG/ML
15 INJECTION, SOLUTION INTRAVENOUS ONCE
Refills: 0 | Status: COMPLETED | OUTPATIENT
Start: 2023-07-11 | End: 2023-07-11

## 2023-07-11 RX ORDER — POLYETHYLENE GLYCOL 3350 17 G/17G
17 POWDER, FOR SOLUTION ORAL EVERY 12 HOURS
Refills: 0 | Status: DISCONTINUED | OUTPATIENT
Start: 2023-07-11 | End: 2023-07-13

## 2023-07-11 RX ORDER — AMLODIPINE BESYLATE 2.5 MG/1
5 TABLET ORAL DAILY
Refills: 0 | Status: DISCONTINUED | OUTPATIENT
Start: 2023-07-11 | End: 2023-07-11

## 2023-07-11 RX ORDER — ACETAMINOPHEN 500 MG
1000 TABLET ORAL ONCE
Refills: 0 | Status: COMPLETED | OUTPATIENT
Start: 2023-07-11 | End: 2023-07-11

## 2023-07-11 RX ORDER — SODIUM CHLORIDE 9 MG/ML
1000 INJECTION INTRAMUSCULAR; INTRAVENOUS; SUBCUTANEOUS
Refills: 0 | Status: DISCONTINUED | OUTPATIENT
Start: 2023-07-11 | End: 2023-07-12

## 2023-07-11 RX ADMIN — Medication 650 MILLIGRAM(S): at 23:54

## 2023-07-11 RX ADMIN — Medication 1000 MILLILITER(S): at 01:27

## 2023-07-11 RX ADMIN — AMLODIPINE BESYLATE 5 MILLIGRAM(S): 2.5 TABLET ORAL at 13:24

## 2023-07-11 RX ADMIN — Medication 1000 MILLIGRAM(S): at 01:38

## 2023-07-11 RX ADMIN — LIOTHYRONINE SODIUM 200 MICROGRAM(S): 25 TABLET ORAL at 07:00

## 2023-07-11 RX ADMIN — LAMOTRIGINE 200 MILLIGRAM(S): 25 TABLET, ORALLY DISINTEGRATING ORAL at 14:38

## 2023-07-11 RX ADMIN — Medication 400 MILLIGRAM(S): at 01:08

## 2023-07-11 RX ADMIN — LITHIUM CARBONATE 450 MILLIGRAM(S): 300 TABLET, EXTENDED RELEASE ORAL at 21:17

## 2023-07-11 RX ADMIN — POTASSIUM PHOSPHATE, MONOBASIC POTASSIUM PHOSPHATE, DIBASIC 62.5 MILLIMOLE(S): 236; 224 INJECTION, SOLUTION INTRAVENOUS at 14:37

## 2023-07-11 RX ADMIN — QUETIAPINE FUMARATE 50 MILLIGRAM(S): 200 TABLET, FILM COATED ORAL at 21:17

## 2023-07-11 NOTE — PATIENT PROFILE ADULT - FUNCTIONAL SCREEN CURRENT LEVEL: SWALLOWING (IF SCORE 2 OR MORE FOR ANY ITEM, CONSULT REHAB SERVICES), MLM)
Call [facility] and let them know that I send in refill of lorazepam now. But, have them put note in patient's chart on the lorazepam order to send future refill requests to her psychiatry nurse practitioner. 0 = swallows foods/liquids without difficulty

## 2023-07-11 NOTE — DIETITIAN INITIAL EVALUATION ADULT - NSFNSPHYEXAMSKINFT_GEN_A_CORE
Pressure Injury 1: gluteal cleft, Stage II  Pressure Injury 2: none, none  Pressure Injury 3: none, none  Pressure Injury 4: none, none  Pressure Injury 5: none, none  Pressure Injury 6: none, none  Pressure Injury 7: none, none  Pressure Injury 8: none, none  Pressure Injury 9: none, none  Pressure Injury 10: none, none  Pressure Injury 11: none, none Pressure Injury 1: gluteal cleft, Stage II

## 2023-07-11 NOTE — PROGRESS NOTE ADULT - ASSESSMENT
Patient is a 56yo male with a PMH of HTN, HLD, CAD s/p PCIx3 stents, Bipolar disorder and failure to thrive for the past 6 months. He presented to the ED with approximately 1 year of abdominal distension that he reports has improved recently. Pt also reports gradual onset of weakness, dizziness and approximately 40lb weight loss during the past 6 months. GI reviewed CT. Found no swirling to suggest volvulus. Pt prepped for colonoscopy today and was NPO overnight.    Plan:    Abdominal Distension  - colonoscopy today with GI  - GI following  - keep NPO for colonoscopy  - SCDs while in bed  - labs pending, will follow up    Thyroid Nodule  - will follow up outpatient with Dr. Jones

## 2023-07-11 NOTE — DIETITIAN INITIAL EVALUATION ADULT - PERTINENT LABORATORY DATA
07-11    142  |  113<H>  |  21  ----------------------------<  66<L>  3.9   |  24  |  0.71    Ca    11.7<H>      11 Jul 2023 06:16    TPro  6.3  /  Alb  3.1<L>  /  TBili  0.6  /  DBili  x   /  AST  17  /  ALT  19  /  AlkPhos  121<H>  07-09  A1C with Estimated Average Glucose Result: 5.2 % (07-10-23 @ 15:31)

## 2023-07-11 NOTE — PROGRESS NOTE ADULT - ASSESSMENT
Patient is a 58yo male with a PMH of HTN, HLD, CAD s/p PCIx3 stents in 2016, Bipolar disorder and failure to thrive for the past 6 months. Scheduled for colonoscopy with GI tomorrow.     CAD s/p PCI 3 stents in 2016  -should be on aspirin daily, would add on after the colonoscopy  -HR is elevated according to the flow sheets,   EKG shows NSR with first degree HB on admission (new from previous)  -would hold off on BB, but consider monitoring on tele if HR remains elevated   - No signs of significant ischemia or volume overload.     BP is elevated  -increased amlodipine to 5mg po qd    - RCRI score 6% class II risk  - Mets 3, patient has difficulty walking long distance or climbing up stairs   - Pt is optimized from cardiovascular standpoint to proceed with planned procedure with routine hemodynamic monitoring.     - Other cardiovascular workup will depend on clinical course.  - All other workup per primary team.  - Will continue to follow.

## 2023-07-11 NOTE — PATIENT PROFILE ADULT - NSPROPTRIGHTCAREGIVER_GEN_A_NUR
yes Alert and oriented to person, place and time/Patient baseline mental status/Awake/Symptoms improved

## 2023-07-11 NOTE — DIETITIAN INITIAL EVALUATION ADULT - OTHER INFO
Patient is a 58yo male with a PMH of HTN, HLD, CAD s/p PCIx3 stents, Bipolar disorder and failure to thrive for the past 6 months. He presented to the ED with approximately 1 year of abdominal distension that he reports has improved recently. Pt also reports gradual onset of weakness, dizziness and approximately 40lb weight loss during the past 6 months. GI reviewed CT. Found no swirling to suggest volvulus. Pt prepped for colonoscopy today and was NPO overnight.    Plan:    Abdominal Distension  - colonoscopy today with GI  - GI following  - keep NPO for colonoscopy  - SCDs while in bed  - labs pending, will follow up    Thyroid Nodule  - will follow up outpatient with Dr. Jones     "Patient is a 58yo male with a PMH of HTN, HLD, CAD s/p PCIx3 stents, Bipolar disorder and failure to thrive for the past 6 months. He presented to the ED with approximately 1 year of abdominal distension that he reports has improved recently. Pt also reports gradual onset of weakness, dizziness and approximately 40lb weight loss during the past 6 months. GI reviewed CT. Found no swirling to suggest volvulus. Pt prepped for colonoscopy today and was NPO overnight."  Pt tells RD during bedside visit that he is 5'11' and lost 20# in 6 months, went from 255# to 235# due to early satiety , he lives alone and does his own food shopping and cooking .   "Patient is a 58yo male with a PMH of HTN, HLD, CAD s/p PCIx3 stents, Bipolar disorder and failure to thrive for the past 6 months. He presented to the ED with approximately 1 year of abdominal distension that he reports has improved recently. Pt also reports gradual onset of weakness, dizziness and approximately 40lb weight loss during the past 6 months. GI reviewed CT. Found no swirling to suggest volvulus as previously described . Pt prepped for colonoscopy today and was NPO overnight."  Pt tells RD during bedside visit that he is 5'11' and lost 20# in 6 months, went from 255# to 235# due to early satiety , he lives alone and does his own food shopping and cooking .

## 2023-07-11 NOTE — DIETITIAN INITIAL EVALUATION ADULT - PERTINENT MEDS FT
MEDICATIONS  (STANDING):  amLODIPine   Tablet 2.5 milliGRAM(s) Oral every 24 hours  lamoTRIgine 200 milliGRAM(s) Oral daily  liothyronine 200 MICROGram(s) Oral daily  lithium CR (ESKALITH-CR) 450 milliGRAM(s) Oral at bedtime  QUEtiapine 50 milliGRAM(s) Oral at bedtime    MEDICATIONS  (PRN):  ondansetron Injectable 4 milliGRAM(s) IV Push four times a day PRN Nausea and/or Vomiting

## 2023-07-11 NOTE — DIETITIAN NUTRITION RISK NOTIFICATION - TREATMENT: THE FOLLOWING DIET HAS BEEN RECOMMENDED
Diet, NPO after Midnight:      NPO Start Date: 10-Jul-2023,   NPO Start Time: 23:59  Except Medications (07-10-23 @ 11:39) [Active]  Diet, Clear Liquid (07-10-23 @ 11:39) [Active]      resume po diet asap  RD will have supplement ordered based on MD evaluation of colonoscopy

## 2023-07-11 NOTE — PHYSICAL THERAPY INITIAL EVALUATION ADULT - ADDITIONAL COMMENTS
pt. lives in a private home with father, 0 EV, 1 flight of indwelling stairs to bedroom. pt. reports PLOF as independent with ambulation and ADLs, but notes decreased function with ADLs, unsteadiness with ambulation, and difficulty with stair negotiation as of late.

## 2023-07-11 NOTE — PATIENT PROFILE ADULT - FALL HARM RISK - HARM RISK INTERVENTIONS

## 2023-07-11 NOTE — DIETITIAN INITIAL EVALUATION ADULT - NS FNS DIET ORDER
Diet, NPO after Midnight:      NPO Start Date: 10-Jul-2023,   NPO Start Time: 23:59  Except Medications (07-10-23 @ 11:39)  Diet, Clear Liquid (07-10-23 @ 11:39)

## 2023-07-12 ENCOUNTER — TRANSCRIPTION ENCOUNTER (OUTPATIENT)
Age: 58
End: 2023-07-12

## 2023-07-12 DIAGNOSIS — E83.52 HYPERCALCEMIA: ICD-10-CM

## 2023-07-12 LAB
24R-OH-CALCIDIOL SERPL-MCNC: 22.6 NG/ML — LOW (ref 30–80)
ALBUMIN SERPL ELPH-MCNC: 3 G/DL — LOW (ref 3.3–5)
ALP SERPL-CCNC: 102 U/L — SIGNIFICANT CHANGE UP (ref 40–120)
ALT FLD-CCNC: 21 U/L — SIGNIFICANT CHANGE UP (ref 12–78)
ANION GAP SERPL CALC-SCNC: 5 MMOL/L — SIGNIFICANT CHANGE UP (ref 5–17)
AST SERPL-CCNC: 17 U/L — SIGNIFICANT CHANGE UP (ref 15–37)
BILIRUB SERPL-MCNC: 1.1 MG/DL — SIGNIFICANT CHANGE UP (ref 0.2–1.2)
BUN SERPL-MCNC: 14 MG/DL — SIGNIFICANT CHANGE UP (ref 7–23)
CALCIUM SERPL-MCNC: 10.8 MG/DL — HIGH (ref 8.4–10.5)
CALCIUM SERPL-MCNC: 10.8 MG/DL — HIGH (ref 8.5–10.1)
CHLORIDE SERPL-SCNC: 114 MMOL/L — HIGH (ref 96–108)
CO2 SERPL-SCNC: 24 MMOL/L — SIGNIFICANT CHANGE UP (ref 22–31)
CREAT SERPL-MCNC: 0.73 MG/DL — SIGNIFICANT CHANGE UP (ref 0.5–1.3)
EGFR: 106 ML/MIN/1.73M2 — SIGNIFICANT CHANGE UP
GLUCOSE SERPL-MCNC: 131 MG/DL — HIGH (ref 70–99)
HCT VFR BLD CALC: 33.1 % — LOW (ref 39–50)
HGB BLD-MCNC: 11 G/DL — LOW (ref 13–17)
MAGNESIUM SERPL-MCNC: 1.6 MG/DL — SIGNIFICANT CHANGE UP (ref 1.6–2.6)
MCHC RBC-ENTMCNC: 26.9 PG — LOW (ref 27–34)
MCHC RBC-ENTMCNC: 33.2 GM/DL — SIGNIFICANT CHANGE UP (ref 32–36)
MCV RBC AUTO: 80.9 FL — SIGNIFICANT CHANGE UP (ref 80–100)
NRBC # BLD: 0 /100 WBCS — SIGNIFICANT CHANGE UP (ref 0–0)
PHOSPHATE SERPL-MCNC: 3 MG/DL — SIGNIFICANT CHANGE UP (ref 2.5–4.5)
PLATELET # BLD AUTO: 164 K/UL — SIGNIFICANT CHANGE UP (ref 150–400)
POTASSIUM SERPL-MCNC: 3.5 MMOL/L — SIGNIFICANT CHANGE UP (ref 3.5–5.3)
POTASSIUM SERPL-SCNC: 3.5 MMOL/L — SIGNIFICANT CHANGE UP (ref 3.5–5.3)
PROT SERPL-MCNC: 6 G/DL — SIGNIFICANT CHANGE UP (ref 6–8.3)
PTH-INTACT FLD-MCNC: 63 PG/ML — SIGNIFICANT CHANGE UP (ref 15–65)
RBC # BLD: 4.09 M/UL — LOW (ref 4.2–5.8)
RBC # FLD: 13.9 % — SIGNIFICANT CHANGE UP (ref 10.3–14.5)
SODIUM SERPL-SCNC: 143 MMOL/L — SIGNIFICANT CHANGE UP (ref 135–145)
VIT D25+D1,25 OH+D1,25 PNL SERPL-MCNC: 30.9 PG/ML — SIGNIFICANT CHANGE UP (ref 19.9–79.3)
WBC # BLD: 4.63 K/UL — SIGNIFICANT CHANGE UP (ref 3.8–10.5)
WBC # FLD AUTO: 4.63 K/UL — SIGNIFICANT CHANGE UP (ref 3.8–10.5)

## 2023-07-12 PROCEDURE — 99232 SBSQ HOSP IP/OBS MODERATE 35: CPT

## 2023-07-12 PROCEDURE — 99253 IP/OBS CNSLTJ NEW/EST LOW 45: CPT

## 2023-07-12 RX ORDER — ACETAMINOPHEN 500 MG
2 TABLET ORAL
Qty: 0 | Refills: 0 | DISCHARGE
Start: 2023-07-12

## 2023-07-12 RX ORDER — AMLODIPINE BESYLATE 2.5 MG/1
1 TABLET ORAL
Qty: 30 | Refills: 0
Start: 2023-07-12 | End: 2023-08-10

## 2023-07-12 RX ORDER — ASPIRIN/CALCIUM CARB/MAGNESIUM 324 MG
1 TABLET ORAL
Qty: 0 | Refills: 0 | DISCHARGE
Start: 2023-07-12

## 2023-07-12 RX ORDER — ASPIRIN/CALCIUM CARB/MAGNESIUM 324 MG
81 TABLET ORAL DAILY
Refills: 0 | Status: DISCONTINUED | OUTPATIENT
Start: 2023-07-12 | End: 2023-07-13

## 2023-07-12 RX ADMIN — LIOTHYRONINE SODIUM 200 MICROGRAM(S): 25 TABLET ORAL at 05:34

## 2023-07-12 RX ADMIN — LITHIUM CARBONATE 450 MILLIGRAM(S): 300 TABLET, EXTENDED RELEASE ORAL at 21:54

## 2023-07-12 RX ADMIN — Medication 81 MILLIGRAM(S): at 13:03

## 2023-07-12 RX ADMIN — AMLODIPINE BESYLATE 10 MILLIGRAM(S): 2.5 TABLET ORAL at 05:34

## 2023-07-12 RX ADMIN — LAMOTRIGINE 200 MILLIGRAM(S): 25 TABLET, ORALLY DISINTEGRATING ORAL at 13:02

## 2023-07-12 RX ADMIN — POLYETHYLENE GLYCOL 3350 17 GRAM(S): 17 POWDER, FOR SOLUTION ORAL at 18:33

## 2023-07-12 RX ADMIN — QUETIAPINE FUMARATE 50 MILLIGRAM(S): 200 TABLET, FILM COATED ORAL at 21:54

## 2023-07-12 NOTE — DISCHARGE NOTE PROVIDER - NSDCMRMEDTOKEN_GEN_ALL_CORE_FT
acetaminophen 325 mg oral tablet: 2 tab(s) orally every 6 hours As needed Temp greater or equal to 38C (100.4F), Mild Pain (1 - 3)  amLODIPine 10 mg oral tablet: 1 tab(s) orally every 24 hours  Caplyta 42 mg oral capsule: 1 orally once a day  Cytomel 50 mcg oral tablet: 4 tab(s) orally once a day  LaMICtal 200 mg oral tablet: 1 orally once a day  lithium 450 mg oral tablet, extended release: 1 orally once a day (at bedtime)  Seroquel 25 mg oral tablet: 1 orally once a day (at bedtime)  Seroquel 50 mg oral tablet: 1 orally once a day (at bedtime)   acetaminophen 325 mg oral tablet: 2 tab(s) orally every 6 hours As needed Temp greater or equal to 38C (100.4F), Mild Pain (1 - 3)  amLODIPine 10 mg oral tablet: 1 tab(s) orally every 24 hours  aspirin 81 mg oral delayed release tablet: 1 tab(s) orally once a day  Caplyta 42 mg oral capsule: 1 orally once a day  Cytomel 50 mcg oral tablet: 4 tab(s) orally once a day  LaMICtal 200 mg oral tablet: 1 orally once a day  lithium 450 mg oral tablet, extended release: 1 orally once a day (at bedtime)  Seroquel 25 mg oral tablet: 1 orally once a day (at bedtime)  Seroquel 50 mg oral tablet: 1 orally once a day (at bedtime)

## 2023-07-12 NOTE — DISCHARGE NOTE PROVIDER - PROVIDER TOKENS
PROVIDER:[TOKEN:[[id number]]],PROVIDER:[TOKEN:[[id number]]],PROVIDER:[TOKEN:[[id number]]] PROVIDER:[TOKEN:[[id number]]],PROVIDER:[TOKEN:[[id number]]],PROVIDER:[TOKEN:[[id number]]],PROVIDER:[TOKEN:[[id number]]]

## 2023-07-12 NOTE — CONSULT NOTE ADULT - CONSULT REASON
Distended sigmoid colon, twisting of mesentery.
Hypercalcemia
abnormal CT
cardiac clearance for colonoscopy
medical co management

## 2023-07-12 NOTE — BH CONSULTATION LIAISON ASSESSMENT NOTE - CURRENT PLAN:
[facility]
                                    [address], [place]  [address]
_________________________________________________________________________________________
                                                                 Signed   
 
 
 
SPECIMEN(S): A TONGUE LESION
 
SPECIMEN SOURCE:
A. TONGUE LESION
 
CLINICAL HISTORY:
Mass on right side of tongue/skin lesion.  Excision of tongue lesion.
 
FINAL PATHOLOGIC DIAGNOSIS:
Tongue lesion, biopsy:
-  Fragments of tonsillar tissue.
-  Minute fragments of polypoid/papillary squamous mucosa with mild reactive 
changes, suggestive of squamous papilloma. 
-  See Comment.
 
COMMENT:
The clinical history of an exophytic mass on the right tongue in the area of 
the lingual tonsil is noted. Sections of the largest fragment of tissue (1.9 
cm) demonstrate features morphologically 
compatible with tonsillar tissue. Focal crypt dilation is present. Also 
received within the same specimen container are minute fragments of polypoid 
reactive squamous mucosa, suggestive of a squamous 
papilloma. A PAS/D stain (with appropriately staining controls) on a 
representative section is -negative for fungal organisms. No malignancy is 
identified. 
Given the location, largest fragment is favored to represent lingual tonsillar 
tissue. Additional correlation with clinical and operative findings is 
recommended. 
As part of [facility]' Quality Improvement Program, this case was 
reviewed by another member of our pathology staff. 
NAL:cml:C2NR
 
MICROSCOPIC EXAMINATION:
Histologic sections of all submitted blocks are examined by light microscopy.  
These findings, together with the gross examination, support the pathologic 
diagnosis. 
 
GROSS DESCRIPTION:
The specimen, labeled "Mima Roldan, tongue lesion," is received in formalin 
and consists of four pink-red soft tissue fragments that range in size from 0.2 
cm up to 1.9 cm in greatest dimension. 
 
                                                                                    
_________________________________________________________________________________________
PATIENT NAME:     MIMA ROLDAN                      
MEDICAL RECORD #: [id number]            PATHOLOGY                     
          ACCT #: [id number]       ACCESSION #: [id number]     
DATE OF BIRTH:   [birth year]            REPORT #: [id number]       
PHYSICIAN:        АНДРЕЙ PICKETT              
PCP:              MIC ESPINOSA NP               
REPORT IS CONFIDENTIAL AND NOT TO BE RELEASED WITHOUT AUTHORIZATION
 
 
                                  [facility]
                                    [address] [address]
_________________________________________________________________________________________
                                                                 Signed   
 
 
The largest tissue fragment is inked and sectioned.    Representative sections 
are submitted in cassettes (A1-A2). 
FB (under the direct supervision of a pathologist)
The Gross Description was prepared using a voice recognition system.  The 
report was reviewed for accuracy; however, sound-alike word errors, addition 
and/or deletions may occur.  If there is any 
question about this report, please contact Client Services.
 
PERFORMING LABORATORY:
The technical component was performed by [facility] [address] (Medical Director: Mikaela Saxena MD; CLIA# [id number]). 
[facility], [address] [place] [id number] (CLIA# [id number]). 
 
Diagnostician:  Leida Nuñez MD
Pathologist
Electronically Signed 09/17/2020
 
 
Copies:                                
~
 
 
 
 
 
 
 
 
 
 
 
 
 
 
 
 
 
 
 
 
 
                                                                                    
_________________________________________________________________________________________
PATIENT NAME:     MIMA ROLDAN                      
MEDICAL RECORD #: [id number]            PATHOLOGY                     
          ACCT #: [id number]       ACCESSION #: [id number]     
DATE OF BIRTH:   [birth year]            REPORT #: [id number]       
PHYSICIAN:        АНДРЕЙ PICKETT              
PCP:              MIC ESPINOSA NP               
REPORT IS CONFIDENTIAL AND NOT TO BE RELEASED WITHOUT AUTHORIZATION
None known

## 2023-07-12 NOTE — CARE COORDINATION ASSESSMENT. - NSCAREPROVIDERS_GEN_ALL_CORE_FT
CARE PROVIDERS:  Accepting Physician: Byron Sweet  Administration: Michael Moreno  Administration: Rivera Chris  Administration: Luann Magallon  Admitting: Byron Sweet  Attending: Byron Sweet  Consultant: Abhishek Weeks  Consultant: Naheed Fair  Consultant: Byron Sweet  Consultant: Jesusita Peng  Consultant: Reese Davis  Consultant: Saul Clark  Consultant: Henrietta Saenz  Consultant: Norbert Perez  Consultant: Leonides Donaldson  Consultant: Lisandra Demarco  Consultant: Milvia Nelson  Consultant: Roosevelt Norwood  Consultant: Milagro Monroy  Consultant: Cam Chao  Covering Team: Perlman, Daryl  ED Attending: Roger Tafoya  ED Attending2: Haryr So ED Nurse: Krystina Solo  HIM/Billing & Coding: Yennifer Szymanski  Nurse: Erika Christine  Nurse: Lorraine Simmons  Nurse: Valerie Crenshaw  Ordered: Doctor, Unknown  Outpatient Provider: Deisy Alvarenga  Override: Marika Agrawal  Override: Tonia Carter  PCA/Nursing Assistant: Comfort Washburn  PCA/Nursing Assistant: Babak Ellis  PCA/Nursing Assistant: Heather Calderon  Primary Team: Schuyler Reyes  Primary Team: Reyes Sky  Primary Team: Nancy Kc  Primary Team: Kamran Mccarthy  Primary Team: Naheed Do  Primary Team: Nilda Segal  Primary Team: Antonio Willson  Primary Team: uTtu Oliver  Primary Team: Rivera Rosario  Registered Dietitian: Dyan Edouard  Registered Dietitian: Ashlyn Victor  : Yennifer Jacobo  Student: Ariana Body// Supp. Assoc.: Shaylee Fuentes

## 2023-07-12 NOTE — CARE COORDINATION ASSESSMENT. - EMPLOYMENT STATUS OF PATIENT
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

## 2023-07-12 NOTE — BH CONSULTATION LIAISON ASSESSMENT NOTE - NSSUICPROTFACT_PSY_ALL_CORE
Responsibility to children, family, or others/Identifies reasons for living/Supportive social network of family or friends/Fear of death or the actual act of killing self/Engaged in work or school/Positive therapeutic relationships/Ability to cope with stress

## 2023-07-12 NOTE — PROGRESS NOTE ADULT - ASSESSMENT
56 y/o male with a PMHx of HTN, HLD, CAD s/p PCIx3 stents, Bipolar disorder and FTT, presenting w/ 1 year of abdominal distension (improving) & weight loss. GI & surg reviewed CT, found no swirling to suggest volvulus. S/p colonoscopy 7/11 - incomplete 2/2 poor bowel prep; remains surgically stable.    PLAN:  - Continue soft & bite sized diet  - GI following, serial abd exams, miralax BID  - Elevated Ca - pt to follow up w/ Dr Jones outpt  - Remains stable, no acute surgical intervention at this time - discharge planning - appreciate medicine & GI input  - Above to be discussed with Dr. Mccarthy (covering for Dr. Sweet)    Surgical Team  Spectralink: [phone]

## 2023-07-12 NOTE — PROGRESS NOTE ADULT - ASSESSMENT
abnormal CT    S/p flex sig, no volvulus seen  discussed with pt and sisters at bedside  sisters requesting inpatient repeat colonoscopy however pt is refusing  follow up their decision    I reviewed the overnight course of events on the unit, re-confirming the patient history. I discussed the care with the patient and their family  Differential diagnosis and plan of care discussed with patient after the evaluation  40 minutes spent on total encounter of which more than fifty percent of the encounter was spent counseling and/or coordinating care by the attending physician.  Advanced care planning was discussed with patient and family.  Advanced care planning forms were reviewed and discussed.  Risks, benefits and alternatives of gastroenterologic procedures were discussed in detail and all questions were answered.   abnormal CT    S/p flex sig, no volvulus seen  discussed with pt and sisters at bedside  sisters requesting inpatient repeat colonoscopy however pt is refusing  pt needs outpatient follow up for colonoscopy  to d/w family    I reviewed the overnight course of events on the unit, re-confirming the patient history. I discussed the care with the patient and their family  Differential diagnosis and plan of care discussed with patient after the evaluation  40 minutes spent on total encounter of which more than fifty percent of the encounter was spent counseling and/or coordinating care by the attending physician.  Advanced care planning was discussed with patient and family.  Advanced care planning forms were reviewed and discussed.  Risks, benefits and alternatives of gastroenterologic procedures were discussed in detail and all questions were answered.

## 2023-07-12 NOTE — PROGRESS NOTE ADULT - ASSESSMENT
56yo male with a PMH of HTN, HLD, CAD s/p PCIx3 stents in 2016, Bipolar disorder and failure to thrive for the past 6 months.     Cardiac optimization, HTN, CAD  - p/w FTT, with abd distention, s/p colonoscopy unable to complete d/t to poor prep, GI following  - no sx intervention at this time per surgery following     - hx of CAD with stents   - patient should be on aspirin daily, would start this admission     - EKG shows NSR with first degree HB on admission (new from previous)  - BP, HR stable and controlled per flow sheet  - continue amlodipine  - continue routine hemodynamics monitoring     - No signs of significant ischemia or volume overload.   - no O2 requirement     - Monitor and replete Lytes. Keep K > 4 and Mg > 2  - Will continue to follow.    [facility]  Nurse Practitioner - Cardiology   call TEAMS

## 2023-07-12 NOTE — DISCHARGE NOTE PROVIDER - HOSPITAL COURSE
HPI: Patient is a 56yo male with a PMH of HTN, HLD, CAD s/p PCIx3 stents, Bipolar disorder and failure to thrive for the past 6 months. He presented to the ED with approximately 1 year of abdominal distension that he reports has improved recently. Pt also reports gradual onset of weakness, dizziness and approximately 40lb weight loss during the past 6 months. He notes that he still has an appetite but does not have the energy to sometimes prepare or fully eat his meals. He has had flatus, no nausea or vomiting, and no abdominal pain. He reports that he has never received a colonoscopy and has not had any previous abdominal surgeries.  (10 Jul 2023 15:36)    Pt had a CT in the ED, impression states: The sigmoid colon is markedly air distended measuring up to 15.5 cm. There is twisting of the mesentery best seen on ([id number]). There is no colonic wall thickening or pneumatosis at this time. There is mild mesenteric edema. There is no significant obstruction proximally. As well as a 1.7 cm right thyroid lobe nodule."    Pt underwent a colonoscopy w/ GI on HOD#1 - however due to inadequate bowel prep - the full colonoscopy could not be performed, however no evidence for volvulus.     Dispo: discharge home w/ output GI follow up. Pt is to also f/u w/ Dr. Jones.     HPI: Patient is a 58yo male with a PMH of HTN, HLD, CAD s/p PCIx3 stents, Bipolar disorder and failure to thrive for the past 6 months. He presented to the ED with approximately 1 year of abdominal distension that he reports has improved recently. Pt also reports gradual onset of weakness, dizziness and approximately 40lb weight loss during the past 6 months. He notes that he still has an appetite but does not have the energy to sometimes prepare or fully eat his meals. He has had flatus, no nausea or vomiting, and no abdominal pain. He reports that he has never received a colonoscopy and has not had any previous abdominal surgeries.     Pt had a CT in the ED, impression states: The sigmoid colon is markedly air distended measuring up to 15.5 cm. There is twisting of the mesentery best seen. There is no colonic wall thickening or pneumatosis at this time. There is mild mesenteric edema. There is no significant obstruction proximally. As well as a 1.7 cm right thyroid lobe nodule."    Pt underwent a colonoscopy w/ GI on Hospital day #1 - however due to inadequate bowel prep - the full colonoscopy could not be performed, however no evidence for volvulus.     He was placed on a soft diet which he tolerated and was cleared for discharge from both a surgical and medical standpoint on 7/12/23.    Dispo: discharge home w/ output GI follow up. Pt is to also f/u w/ Dr. Jones (thyroid nodule).     HPI: Patient is a 58yo male with a PMH of HTN, HLD, CAD s/p PCIx3 stents, Bipolar disorder and failure to thrive for the past 6 months. He presented to the ED with approximately 1 year of abdominal distension that he reports has improved recently. Pt also reports gradual onset of weakness, dizziness and approximately 40lb weight loss during the past 6 months. He notes that he still has an appetite but does not have the energy to sometimes prepare or fully eat his meals. He has had flatus, no nausea or vomiting, and no abdominal pain. He reports that he has never received a colonoscopy and has not had any previous abdominal surgeries.     Pt had a CT in the ED, impression states: The sigmoid colon is markedly air distended measuring up to 15.5 cm. There is twisting of the mesentery best seen. There is no colonic wall thickening or pneumatosis at this time. There is mild mesenteric edema. There is no significant obstruction proximally. As well as a 1.7 cm right thyroid lobe nodule."    Pt underwent a colonoscopy w/ GI on Hospital day #1 - however due to inadequate bowel prep - the full colonoscopy could not be performed, however no evidence for volvulus.     He was placed on a soft diet which he tolerated and was cleared for discharge from both a surgical and medical standpoint on 7/12/23.    In the afternoon on 7/12/23, patient discussed thoughts of suicidal ideation with social work. Psych was then consulted and cleared patient for discharge as patient had no plan, consistent thoughts and follows with Psych at an outpatient clinic.    Dispo: discharge home w/ outpatient GI follow up. Pt is to also f/u w/ Dr. Jones (thyroid nodule).

## 2023-07-12 NOTE — DISCHARGE NOTE PROVIDER - NSDCFUSCHEDAPPT_GEN_ALL_CORE_FT
J Luis Jones  [facility]  [facility] [address]  Scheduled Appointment: 08/03/2023

## 2023-07-12 NOTE — PROGRESS NOTE ADULT - PROBLEM SELECTOR PLAN 1
surgical eval and follow up  s/p flex sig- sig colonic dilatation  repeat colonoscopy 4-8 weeks per GI
surgical eval and follow up  surgical intervention vs colonoscopy

## 2023-07-12 NOTE — CARE COORDINATION ASSESSMENT. - NSDCPLANSERVICES_GEN_ALL_CORE
The patienthas been recommended for Home Care Pt. Spoke with psych MD about patients report of suicidal ideation with no plan. Spoke with MD requesting out reach to Health Care Proxy/Home Health Services

## 2023-07-12 NOTE — DISCHARGE NOTE PROVIDER - DETAILS OF MALNUTRITION DIAGNOSIS/DIAGNOSES
This patient has been assessed with a concern for Malnutrition and was treated during this hospitalization for the following Nutrition diagnosis/diagnoses:     -  07/11/2023: Moderate protein-calorie malnutrition

## 2023-07-12 NOTE — BH CONSULTATION LIAISON ASSESSMENT NOTE - NSBHCHARTREVIEWLAB_PSY_A_CORE FT
Yes
                      11.0   4.63  )-----------( 164      ( 12 Jul 2023 09:05 )             33.1   07-12    143  |  114<H>  |  14  ----------------------------<  131<H>  3.5   |  24  |  0.73    Ca    10.8<H>      12 Jul 2023 09:05  Phos  3.0     07-12  Mg     1.6     07-12    TPro  6.0  /  Alb  3.0<L>  /  TBili  1.1  /  DBili  x   /  AST  17  /  ALT  21  /  AlkPhos  102  07-12

## 2023-07-12 NOTE — DISCHARGE NOTE PROVIDER - NSDCCPCAREPLAN_GEN_ALL_CORE_FT
PRINCIPAL DISCHARGE DIAGNOSIS  Diagnosis: Sigmoid volvulus  Assessment and Plan of Treatment: ruled out    
(2) assistive person

## 2023-07-12 NOTE — CARE COORDINATION ASSESSMENT. - ADDITIONAL HEALTH CARE SERVICES UTILIZED PRIOR TO ADMISSION
Patient receives therapy and psychiatry at [facility]. He attend 1-2x weekly. He reports he has bipolar disorder, primarily depressed.  He reports two past suicidal attempts and multiple hospitalization./behavioral health services/psychiatrists

## 2023-07-12 NOTE — BH CONSULTATION LIAISON ASSESSMENT NOTE - NSBHCHARTREVIEWVS_PSY_A_CORE FT
Vital Signs Last 24 Hrs  T(C): 36.6 (12 Jul 2023 12:42), Max: 37 (11 Jul 2023 20:55)  T(F): 97.9 (12 Jul 2023 12:42), Max: 98.6 (11 Jul 2023 20:55)  HR: 85 (12 Jul 2023 12:42) (81 - 90)  BP: 134/72 (12 Jul 2023 12:42) (134/72 - 168/66)  BP(mean): --  RR: 16 (12 Jul 2023 12:42) (14 - 18)  SpO2: 98% (12 Jul 2023 12:42) (95% - 98%)    Parameters below as of 12 Jul 2023 12:42  Patient On (Oxygen Delivery Method): room air

## 2023-07-12 NOTE — CARE COORDINATION ASSESSMENT. - MET/SPOKE WITH
Met with patient and sister rebecca together and apart. Patient agreed SW can speak to all sisters though Karina Fang [phone] is the primary caretaker. She reports sheis Health Care Proxy but states he has not completed a form. will ask Palliative care to see him for MOLST completion./sister

## 2023-07-12 NOTE — CONSULT NOTE ADULT - SUBJECTIVE AND OBJECTIVE BOX
Patient is a 57y old  Male who presents with a chief complaint of 1 year of abdominal distension (12 Jul 2023 09:02)    HPI:  Patient is a 58yo male with a PMH of HTN, HLD, CAD s/p PCIx3 stents, Bipolar disorder and failure to thrive for the past 6 months. He presented to the ED with approximately 1 year of abdominal distension that he reports has improved recently. Pt also reports gradual onset of weakness, dizziness and approximately 40lb weight loss during the past 6 months. He notes that he still has an appetite but does not have the energy to sometimes prepare or fully eat his meals. He has had flatus, no nausea or vomiting, and no abdominal pain. He reports that he has never received a colonoscopy and has not had any previous abdominal surgeries.  (10 Jul 2023 15:36)      PAST MEDICAL HISTORY:  Chronic constipation    HLD (hyperlipidemia)    Obesity    Anxiety    Prediabetes    HTN (hypertension)    CAD (coronary artery disease)        PAST SURGICAL HISTORY:  S/P coronary artery stent placement        FAMILY HISTORY:      SOCIAL HISTORY:    Allergies    No Known Allergies    Intolerances      Home Medications:  acetaminophen 325 mg oral tablet: 2 tab(s) orally every 6 hours As needed Temp greater or equal to 38C (100.4F), Mild Pain (1 - 3) (12 Jul 2023 09:06)  Caplyta 42 mg oral capsule: 1 orally once a day (10 Jul 2023 12:52)  Cytomel 50 mcg oral tablet: 4 tab(s) orally once a day (10 Jul 2023 12:52)  LaMICtal 200 mg oral tablet: 1 orally once a day (10 Jul 2023 12:52)  lithium 450 mg oral tablet, extended release: 1 orally once a day (at bedtime) (10 Jul 2023 12:52)  Seroquel 25 mg oral tablet: 1 orally once a day (at bedtime) (10 Jul 2023 12:52)  Seroquel 50 mg oral tablet: 1 orally once a day (at bedtime) (10 Jul 2023 12:52)    MEDICATIONS  (STANDING):  amLODIPine   Tablet 10 milliGRAM(s) Oral every 24 hours  lamoTRIgine 200 milliGRAM(s) Oral daily  liothyronine 200 MICROGram(s) Oral daily  lithium CR (ESKALITH-CR) 450 milliGRAM(s) Oral at bedtime  polyethylene glycol 3350 17 Gram(s) Oral every 12 hours  QUEtiapine 50 milliGRAM(s) Oral at bedtime  sodium chloride 0.9%. 1000 milliLiter(s) (100 mL/Hr) IV Continuous <Continuous>    MEDICATIONS  (PRN):  acetaminophen     Tablet .. 650 milliGRAM(s) Oral every 6 hours PRN Temp greater or equal to 38C (100.4F), Mild Pain (1 - 3)  ondansetron Injectable 4 milliGRAM(s) IV Push four times a day PRN Nausea and/or Vomiting      REVIEW OF SYSTEMS:  General:   Respiratory: No cough, SOB  Cardiovascular: No CP or Palpitations	  Gastrointestinal: No nausea, Vomiting. No diarrhea  Genitourinary: No urinary complaints	  Musculoskeletal: No leg swelling, No new rash or lesions	  Neurological: 	  all other systems negative    T(F): 97.9 (07-12-23 @ 04:28), Max: 98.6 (07-11-23 @ 20:55)  HR: 81 (07-12-23 @ 04:28) (80 - 90)  BP: 138/77 (07-12-23 @ 04:28) (138/77 - 169/83)  RR: 14 (07-12-23 @ 04:28) (14 - 18)  SpO2: 95% (07-12-23 @ 04:28) (95% - 98%)  Wt(kg): --    PHYSICAL EXAM:  General: NAD  Respiratory: b/l air entry  Cardiovascular: S1 S2  Gastrointestinal: soft  Extremities: edema        07-11    142  |  113<H>  |  21  ----------------------------<  66<L>  3.9   |  24  |  0.71    Ca    11.7<H>      11 Jul 2023 06:16  Phos  2.2     07-11  Mg     1.8     07-11                            11.0   4.63  )-----------( 164      ( 12 Jul 2023 09:05 )             33.1       Hemoglobin: 11.0 g/dL (07-12 @ 09:05)  Hematocrit: 33.1 % (07-12 @ 09:05)  Hemoglobin: 11.6 g/dL (07-11 @ 06:16)  Hematocrit: 35.8 % (07-11 @ 06:16)      Creatinine, Serum: 0.71 (07-11 @ 06:16)  Creatinine, Serum: 1.10 (07-09 @ 23:55)      Urinalysis Basic - ( 11 Jul 2023 06:16 )    Color: x / Appearance: x / SG: x / pH: x  Gluc: 66 mg/dL / Ketone: x  / Bili: x / Urobili: x   Blood: x / Protein: x / Nitrite: x   Leuk Esterase: x / RBC: x / WBC x   Sq Epi: x / Non Sq Epi: x / Bacteria: x                    I&O's Detail    11 Jul 2023 07:01  -  12 Jul 2023 07:00  --------------------------------------------------------  IN:    sodium chloride 0.9%: 800 mL  Total IN: 800 mL    OUT:  Total OUT: 0 mL    Total NET: 800 mL             Patient is a 57y old  Male who presents with a chief complaint of 1 year of abdominal distension (12 Jul 2023 09:02)    HPI:  Patient is a 58yo male with a PMH of HTN, HLD, CAD s/p PCIx3 stents, Bipolar disorder and failure to thrive for the past 6 months. He presented to the ED with approximately 1 year of abdominal distension that he reports has improved recently. Pt also reports gradual onset of weakness, dizziness and approximately 40lb weight loss during the past 6 months. He notes that he still has an appetite but does not have the energy to sometimes prepare or fully eat his meals. He has had flatus, no nausea or vomiting, and no abdominal pain. He reports that he has never received a colonoscopy and has not had any previous abdominal surgeries.  (10 Jul 2023 15:36)    Renal consult called for hypercalcemia.       PAST MEDICAL HISTORY:  Chronic constipation    HLD (hyperlipidemia)    Obesity    Anxiety    Prediabetes    HTN (hypertension)    CAD (coronary artery disease)        PAST SURGICAL HISTORY:  S/P coronary artery stent placement        FAMILY HISTORY:      SOCIAL HISTORY: No smoking or alcohol use     Allergies    No Known Allergies    Intolerances      Home Medications:  acetaminophen 325 mg oral tablet: 2 tab(s) orally every 6 hours As needed Temp greater or equal to 38C (100.4F), Mild Pain (1 - 3) (12 Jul 2023 09:06)  Caplyta 42 mg oral capsule: 1 orally once a day (10 Jul 2023 12:52)  Cytomel 50 mcg oral tablet: 4 tab(s) orally once a day (10 Jul 2023 12:52)  LaMICtal 200 mg oral tablet: 1 orally once a day (10 Jul 2023 12:52)  lithium 450 mg oral tablet, extended release: 1 orally once a day (at bedtime) (10 Jul 2023 12:52)  Seroquel 25 mg oral tablet: 1 orally once a day (at bedtime) (10 Jul 2023 12:52)  Seroquel 50 mg oral tablet: 1 orally once a day (at bedtime) (10 Jul 2023 12:52)    MEDICATIONS  (STANDING):  amLODIPine   Tablet 10 milliGRAM(s) Oral every 24 hours  lamoTRIgine 200 milliGRAM(s) Oral daily  liothyronine 200 MICROGram(s) Oral daily  lithium CR (ESKALITH-CR) 450 milliGRAM(s) Oral at bedtime  polyethylene glycol 3350 17 Gram(s) Oral every 12 hours  QUEtiapine 50 milliGRAM(s) Oral at bedtime  sodium chloride 0.9%. 1000 milliLiter(s) (100 mL/Hr) IV Continuous <Continuous>    MEDICATIONS  (PRN):  acetaminophen     Tablet .. 650 milliGRAM(s) Oral every 6 hours PRN Temp greater or equal to 38C (100.4F), Mild Pain (1 - 3)  ondansetron Injectable 4 milliGRAM(s) IV Push four times a day PRN Nausea and/or Vomiting      REVIEW OF SYSTEMS:  General: no distress  Respiratory: No cough, SOB  Cardiovascular: No CP or Palpitations	  Gastrointestinal: No nausea, Vomiting. No diarrhea  Genitourinary: No urinary complaints	  Musculoskeletal: No new rash or lesions	  all other systems negative    T(F): 97.9 (07-12-23 @ 04:28), Max: 98.6 (07-11-23 @ 20:55)  HR: 81 (07-12-23 @ 04:28) (80 - 90)  BP: 138/77 (07-12-23 @ 04:28) (138/77 - 169/83)  RR: 14 (07-12-23 @ 04:28) (14 - 18)  SpO2: 95% (07-12-23 @ 04:28) (95% - 98%)  Wt(kg): --    PHYSICAL EXAM:  General: NAD  Respiratory: b/l air entry  Cardiovascular: S1 S2  Gastrointestinal: soft  Extremities: no edema        07-11    142  |  113<H>  |  21  ----------------------------<  66<L>  3.9   |  24  |  0.71    Ca    11.7<H>      11 Jul 2023 06:16  Phos  2.2     07-11  Mg     1.8     07-11                            11.0   4.63  )-----------( 164      ( 12 Jul 2023 09:05 )             33.1       Hemoglobin: 11.0 g/dL (07-12 @ 09:05)  Hematocrit: 33.1 % (07-12 @ 09:05)  Hemoglobin: 11.6 g/dL (07-11 @ 06:16)  Hematocrit: 35.8 % (07-11 @ 06:16)      Creatinine, Serum: 0.71 (07-11 @ 06:16)  Creatinine, Serum: 1.10 (07-09 @ 23:55)      Urinalysis Basic - ( 11 Jul 2023 06:16 )    Color: x / Appearance: x / SG: x / pH: x  Gluc: 66 mg/dL / Ketone: x  / Bili: x / Urobili: x   Blood: x / Protein: x / Nitrite: x   Leuk Esterase: x / RBC: x / WBC x   Sq Epi: x / Non Sq Epi: x / Bacteria: x          I&O's Detail    11 Jul 2023 07:01  -  12 Jul 2023 07:00  --------------------------------------------------------  IN:    sodium chloride 0.9%: 800 mL  Total IN: 800 mL    OUT:  Total OUT: 0 mL    Total NET: 800 mL        < from: US Renal (07.11.23 @ 13:13) >    ACC: [id number] EXAM:  US KIDNEY(S)   ORDERED BY: SIRIA CARMEN     PROCEDURE DATE:  07/11/2023          INTERPRETATION:  CLINICAL INFORMATION: Indeterminate cortical lesion   interpolar left kidney on CT of 7/10/2020    COMPARISON: CT abdomen and pelvis 7/10/2023.    TECHNIQUE: Sonography of the kidneys and bladder.    FINDINGS:  Right kidney: 11.4 cm. No renal mass, hydronephrosis or calculi.    Left kidney: 12.3 cm. No  hydronephrosis or calculi. Exophytic   predominantly hyperechoic lesion interpolar cortex measuring   approximately 1.1 cm corresponding to the CT abnormality. This may   represent a hemorrhagic cyst or angiomyolipoma. Solid lesion not   excluded. Correlate with renal MRI    Urinary bladder: Distended up to 5 46 cc. The patient had no urge to void    IMPRESSION:  Left renal lesion remains indeterminate. Recommend dedicated renal MRI.        --- End of Report ---            NORMA RODRIGUEZ MD; Attending Radiologist  This document has been electronically signed. Jul 11 2023  2:04PM    < end of copied text >

## 2023-07-12 NOTE — BH CONSULTATION LIAISON ASSESSMENT NOTE - HPI (INCLUDE ILLNESS QUALITY, SEVERITY, DURATION, TIMING, CONTEXT, MODIFYING FACTORS, ASSOCIATED SIGNS AND SYMPTOMS)
Patient seen, evaluated and chart reviewed. Patient is a 56 y/o DWM, with 4 prior psychiatric hospitalizations, history of bipolar disorder, with a PMH of HTN, HLD, CAD s/p PCIx3 stents, failure to thrive for the past 6 months. He presented to the ED with approximately 1 year of abdominal distension that he reports has improved recently. Pt also reports gradual onset of weakness, dizziness and approximately 40lb weight loss during the past 6 months. He notes that he still has an appetite but does not have the energy to sometimes prepare or fully eat his meals. He has had flatus, no nausea or vomiting, and no abdominal pain. He reports that he has never received a colonoscopy and has not had any previous abdominal surgeries. Patient has had a colonoscopy which was inconclusive and he is anticipating another one, and he was ambivalent about it. He reports history of bipolar disorder, and is being currently treatment at [facility]. Patient reportedly expressed suicidal ideation to the  and psychiatric consult was requested. Patient admitted to occasional passive suicidal ideation, but denies active suicidal ideation, there is no plan or intent. No evidence of psychosis, shruthi or depression.

## 2023-07-12 NOTE — PROGRESS NOTE ADULT - NSPROGADDITIONALINFOA_GEN_ALL_CORE
I spoke to patient and his sister with another sister on the phone  sisters concerned for GI malignancy I explained that CT did not show any obvious GI malignancy  this symptoms have been ongoing for almost a year but patient has not sought out medical attention until very recently; I explained that he has to be more proactive for his own healthcare; hopefully his sisters can aid him in this  another colonoscopy is not being offered given his severe colonic distention  he will need miralax bid and hopefully his distention will improve  he can follow up with his primary GI as outpatient  will sign off   all questions answered;

## 2023-07-12 NOTE — CARE COORDINATION ASSESSMENT. - NSPASTMEDSURGHISTORY_GEN_ALL_CORE_FT
PAST MEDICAL & SURGICAL HISTORY:  CAD (coronary artery disease)      HTN (hypertension)      Prediabetes      Anxiety      Obesity      HLD (hyperlipidemia)      Chronic constipation      S/P coronary artery stent placement

## 2023-07-12 NOTE — DISCHARGE NOTE PROVIDER - CARE PROVIDER_API CALL
Leonides Donaldson  Gastroenterology  [address] [place] [address]  Phone: [phone]  Fax: [phone]  Follow Up Time:     J Luis Jones  Surgery  [address] [place] [address]  Phone: [phone]  Fax: [phone]  Follow Up Time:     Byorn Sweet  Surgery  [address] [place] [address]  Phone: [phone]  Fax: [phone]  Follow Up Time:    J Luis Jones  Surgery  [address] [place] [address]  Phone: [phone]  Fax: [phone]  Follow Up Time:     Byron Sweet  Surgery  [address] [place] [address]  Phone: [phone]  Fax: [phone]  Follow Up Time:     Deisy Alvarenga  [facility] Medicine  [address] [place] [address]  Phone: [phone]  Fax: [phone]  Follow Up Time:     Perlman, Theodore Max  Gastroenterology  [address], [address]  [place] [address]  Phone: [phone]  Fax: [phone]  Follow Up Time:

## 2023-07-12 NOTE — CONSULT NOTE ADULT - ASSESSMENT
Hypercalcemia: Secondary to chronic lithium use  Hypertension  Abnormal CT abd, ? Volvulus    Continue IV hydration. Follow calcium levels. Labs as ordered. Will follow ipth, phos. GI follow up.   Will follow electrolytes and renal function trend.     Further recommendations pending clinical course. Thank you for the courtesy of this referral.

## 2023-07-13 ENCOUNTER — TRANSCRIPTION ENCOUNTER (OUTPATIENT)
Age: 58
End: 2023-07-13

## 2023-07-13 VITALS
HEART RATE: 74 BPM | TEMPERATURE: 98 F | DIASTOLIC BLOOD PRESSURE: 65 MMHG | RESPIRATION RATE: 19 BRPM | SYSTOLIC BLOOD PRESSURE: 126 MMHG | OXYGEN SATURATION: 97 %

## 2023-07-13 LAB
% ALBUMIN: 56.2 % — SIGNIFICANT CHANGE UP
% ALPHA 1: 5.5 % — SIGNIFICANT CHANGE UP
% ALPHA 2: 12.5 % — SIGNIFICANT CHANGE UP
% BETA: 10.4 % — SIGNIFICANT CHANGE UP
% GAMMA: 15.4 % — SIGNIFICANT CHANGE UP
ACE SERPL-CCNC: 25 U/L — SIGNIFICANT CHANGE UP (ref 14–82)
AFP-TM SERPL-MCNC: <1.8 NG/ML — SIGNIFICANT CHANGE UP
ALBUMIN SERPL ELPH-MCNC: 3.1 G/DL — LOW (ref 3.6–5.5)
ALBUMIN/GLOB SERPL ELPH: 1.2 RATIO — SIGNIFICANT CHANGE UP
ALPHA1 GLOB SERPL ELPH-MCNC: 0.3 G/DL — SIGNIFICANT CHANGE UP (ref 0.1–0.4)
ALPHA2 GLOB SERPL ELPH-MCNC: 0.7 G/DL — SIGNIFICANT CHANGE UP (ref 0.5–1)
ANION GAP SERPL CALC-SCNC: 4 MMOL/L — LOW (ref 5–17)
B-GLOBULIN SERPL ELPH-MCNC: 0.6 G/DL — SIGNIFICANT CHANGE UP (ref 0.5–1)
BUN SERPL-MCNC: 14 MG/DL — SIGNIFICANT CHANGE UP (ref 7–23)
CALCIUM SERPL-MCNC: 11.1 MG/DL — HIGH (ref 8.5–10.1)
CALCIUM SERPL-MCNC: 11.5 MG/DL — HIGH (ref 8.5–10.1)
CANCER AG19-9 SERPL-ACNC: <2 U/ML — SIGNIFICANT CHANGE UP
CEA SERPL-MCNC: 0.7 NG/ML — SIGNIFICANT CHANGE UP (ref 0–3.8)
CHLORIDE SERPL-SCNC: 115 MMOL/L — HIGH (ref 96–108)
CO2 SERPL-SCNC: 25 MMOL/L — SIGNIFICANT CHANGE UP (ref 22–31)
CREAT SERPL-MCNC: 0.67 MG/DL — SIGNIFICANT CHANGE UP (ref 0.5–1.3)
EGFR: 109 ML/MIN/1.73M2 — SIGNIFICANT CHANGE UP
GAMMA GLOBULIN: 0.9 G/DL — SIGNIFICANT CHANGE UP (ref 0.6–1.6)
GLUCOSE SERPL-MCNC: 90 MG/DL — SIGNIFICANT CHANGE UP (ref 70–99)
HCT VFR BLD CALC: 34.8 % — LOW (ref 39–50)
HGB BLD-MCNC: 11.1 G/DL — LOW (ref 13–17)
MCHC RBC-ENTMCNC: 26.5 PG — LOW (ref 27–34)
MCHC RBC-ENTMCNC: 31.9 GM/DL — LOW (ref 32–36)
MCV RBC AUTO: 83.1 FL — SIGNIFICANT CHANGE UP (ref 80–100)
NRBC # BLD: 0 /100 WBCS — SIGNIFICANT CHANGE UP (ref 0–0)
PLATELET # BLD AUTO: 168 K/UL — SIGNIFICANT CHANGE UP (ref 150–400)
POTASSIUM SERPL-MCNC: 3.6 MMOL/L — SIGNIFICANT CHANGE UP (ref 3.5–5.3)
POTASSIUM SERPL-SCNC: 3.6 MMOL/L — SIGNIFICANT CHANGE UP (ref 3.5–5.3)
PROT PATTERN SERPL ELPH-IMP: SIGNIFICANT CHANGE UP
PROT SERPL-MCNC: 5.6 G/DL — LOW (ref 6–8.3)
PROT SERPL-MCNC: 5.6 G/DL — LOW (ref 6–8.3)
PSA FREE FLD-MCNC: 0.18 NG/ML — SIGNIFICANT CHANGE UP
PSA FREE FLD-MCNC: 47 % — SIGNIFICANT CHANGE UP
PSA SERPL-MCNC: 0.39 NG/ML — SIGNIFICANT CHANGE UP (ref 0–4)
RBC # BLD: 4.19 M/UL — LOW (ref 4.2–5.8)
RBC # FLD: 14.2 % — SIGNIFICANT CHANGE UP (ref 10.3–14.5)
SODIUM SERPL-SCNC: 144 MMOL/L — SIGNIFICANT CHANGE UP (ref 135–145)
WBC # BLD: 5.11 K/UL — SIGNIFICANT CHANGE UP (ref 3.8–10.5)
WBC # FLD AUTO: 5.11 K/UL — SIGNIFICANT CHANGE UP (ref 3.8–10.5)

## 2023-07-13 PROCEDURE — 85610 PROTHROMBIN TIME: CPT

## 2023-07-13 PROCEDURE — 70450 CT HEAD/BRAIN W/O DYE: CPT | Mod: MA

## 2023-07-13 PROCEDURE — 83970 ASSAY OF PARATHORMONE: CPT

## 2023-07-13 PROCEDURE — 86301 IMMUNOASSAY TUMOR CA 19-9: CPT

## 2023-07-13 PROCEDURE — 82378 CARCINOEMBRYONIC ANTIGEN: CPT

## 2023-07-13 PROCEDURE — 36415 COLL VENOUS BLD VENIPUNCTURE: CPT

## 2023-07-13 PROCEDURE — 80048 BASIC METABOLIC PNL TOTAL CA: CPT

## 2023-07-13 PROCEDURE — 97165 OT EVAL LOW COMPLEX 30 MIN: CPT

## 2023-07-13 PROCEDURE — 82164 ANGIOTENSIN I ENZYME TEST: CPT

## 2023-07-13 PROCEDURE — 82105 ALPHA-FETOPROTEIN SERUM: CPT

## 2023-07-13 PROCEDURE — 99232 SBSQ HOSP IP/OBS MODERATE 35: CPT

## 2023-07-13 PROCEDURE — 86900 BLOOD TYPING SEROLOGIC ABO: CPT

## 2023-07-13 PROCEDURE — 82306 VITAMIN D 25 HYDROXY: CPT

## 2023-07-13 PROCEDURE — 86901 BLOOD TYPING SEROLOGIC RH(D): CPT

## 2023-07-13 PROCEDURE — 80053 COMPREHEN METABOLIC PANEL: CPT

## 2023-07-13 PROCEDURE — 83690 ASSAY OF LIPASE: CPT

## 2023-07-13 PROCEDURE — 76536 US EXAM OF HEAD AND NECK: CPT

## 2023-07-13 PROCEDURE — 84153 ASSAY OF PSA TOTAL: CPT

## 2023-07-13 PROCEDURE — 74177 CT ABD & PELVIS W/CONTRAST: CPT | Mod: MA

## 2023-07-13 PROCEDURE — 97116 GAIT TRAINING THERAPY: CPT

## 2023-07-13 PROCEDURE — 84155 ASSAY OF PROTEIN SERUM: CPT

## 2023-07-13 PROCEDURE — 84165 PROTEIN E-PHORESIS SERUM: CPT

## 2023-07-13 PROCEDURE — 96360 HYDRATION IV INFUSION INIT: CPT

## 2023-07-13 PROCEDURE — 81003 URINALYSIS AUTO W/O SCOPE: CPT

## 2023-07-13 PROCEDURE — 85027 COMPLETE CBC AUTOMATED: CPT

## 2023-07-13 PROCEDURE — 85730 THROMBOPLASTIN TIME PARTIAL: CPT

## 2023-07-13 PROCEDURE — 93005 ELECTROCARDIOGRAM TRACING: CPT

## 2023-07-13 PROCEDURE — 83605 ASSAY OF LACTIC ACID: CPT

## 2023-07-13 PROCEDURE — 82150 ASSAY OF AMYLASE: CPT

## 2023-07-13 PROCEDURE — 82962 GLUCOSE BLOOD TEST: CPT

## 2023-07-13 PROCEDURE — 85025 COMPLETE CBC W/AUTO DIFF WBC: CPT

## 2023-07-13 PROCEDURE — 83735 ASSAY OF MAGNESIUM: CPT

## 2023-07-13 PROCEDURE — 97530 THERAPEUTIC ACTIVITIES: CPT

## 2023-07-13 PROCEDURE — 84154 ASSAY OF PSA FREE: CPT

## 2023-07-13 PROCEDURE — 71260 CT THORAX DX C+: CPT | Mod: MA

## 2023-07-13 PROCEDURE — 76775 US EXAM ABDO BACK WALL LIM: CPT

## 2023-07-13 PROCEDURE — 99285 EMERGENCY DEPT VISIT HI MDM: CPT | Mod: 25

## 2023-07-13 PROCEDURE — 80307 DRUG TEST PRSMV CHEM ANLYZR: CPT

## 2023-07-13 PROCEDURE — 84100 ASSAY OF PHOSPHORUS: CPT

## 2023-07-13 PROCEDURE — 86803 HEPATITIS C AB TEST: CPT

## 2023-07-13 PROCEDURE — 83036 HEMOGLOBIN GLYCOSYLATED A1C: CPT

## 2023-07-13 PROCEDURE — 82310 ASSAY OF CALCIUM: CPT

## 2023-07-13 PROCEDURE — 80178 ASSAY OF LITHIUM: CPT

## 2023-07-13 PROCEDURE — 86850 RBC ANTIBODY SCREEN: CPT

## 2023-07-13 PROCEDURE — 99239 HOSP IP/OBS DSCHRG MGMT >30: CPT

## 2023-07-13 PROCEDURE — 82652 VIT D 1 25-DIHYDROXY: CPT

## 2023-07-13 RX ADMIN — Medication 81 MILLIGRAM(S): at 12:45

## 2023-07-13 RX ADMIN — AMLODIPINE BESYLATE 10 MILLIGRAM(S): 2.5 TABLET ORAL at 05:22

## 2023-07-13 RX ADMIN — LIOTHYRONINE SODIUM 200 MICROGRAM(S): 25 TABLET ORAL at 05:23

## 2023-07-13 RX ADMIN — LAMOTRIGINE 200 MILLIGRAM(S): 25 TABLET, ORALLY DISINTEGRATING ORAL at 12:45

## 2023-07-13 NOTE — PROGRESS NOTE ADULT - REASON FOR ADMISSION
1 year of abdominal distension
sigmoid distension
1 year of abdominal distension

## 2023-07-13 NOTE — OCCUPATIONAL THERAPY INITIAL EVALUATION ADULT - RANGE OF MOTION EXAMINATION, UPPER EXTREMITY
Muscle strength UE's grossly 4+/5 t/o. Fine motor skills: WFL's with mild tremors noted./bilateral UE Active ROM was WFL  (within functional limits)

## 2023-07-13 NOTE — PROGRESS NOTE ADULT - ASSESSMENT
Hypercalcemia: Secondary to chronic lithium use  Hypertension  Abnormal CT abd, ? Volvulus    Continue IV hydration. PO lasix x 1 dose. Monitor calcium trend. iPTH not suppressed. GI follow up.   Will follow electrolytes and renal function trend.

## 2023-07-13 NOTE — PROGRESS NOTE ADULT - ASSESSMENT
56 y/o male with a PMHx of HTN, HLD, CAD s/p PCIx3 stents, Bipolar disorder and FTT, presenting w/ 1 year of abdominal distension (improving) & weight loss. GI & surg reviewed CT, found no swirling to suggest volvulus. S/p colonoscopy 7/11 - incomplete 2/2 poor bowel prep; remains surgically stable. Cleared for discharge from Psych standpoint; no plan to act on thoughts, has good social support. Abdomen soft, nontender and distended.    Plan:  Abdominal Distension  - abdominal exam remains unchanged; soft, nontender and distended  - Surgically stable and cleared for discharge from surgical standpoint, will begin discharge planning  - follow up outpatient for colonoscopy with GI  - would appreciate recommendations from Medicine    Thyroid Nodule  - follow up outpatient with Dr. Jones    Suicidal Ideation  - cleared by psych for discharge  - follows with [facility]

## 2023-07-13 NOTE — OCCUPATIONAL THERAPY INITIAL EVALUATION ADULT - STRENGTHENING, PT EVAL
Patient will improve overall strength and endurance to perform ADLs at an independent level in 2-4 sessions.

## 2023-07-13 NOTE — PROGRESS NOTE ADULT - ASSESSMENT
58yo male with a PMH of HTN, HLD, CAD s/p PCIx3 stents in 2016, Bipolar disorder and failure to thrive for the past 6 months.     Cardiac optimization, HTN, CAD  - p/w FTT, with abd distention, s/p colonoscopy unable to complete d/t to poor prep, GI following, refusing repeat study   - no sx intervention at this time per surgery following     - hx of CAD with stents   - EKG shows NSR with first degree HB on admission (new from previous)  - BP, HR stable and controlled per flow sheet  - continue amlodipine  - continue ASA  - continue routine hemodynamics monitoring     - No signs of significant ischemia or volume overload.   - Monitor and replete Lytes. Keep K > 4 and Mg > 2  - Will continue to follow.    [facility]  Nurse Practitioner - Cardiology   call TEAMS

## 2023-07-13 NOTE — PROGRESS NOTE ADULT - SUBJECTIVE AND OBJECTIVE BOX
Date of Service 07-11-23 @ 16:10    Patient is a 57y old  Male who presents with a chief complaint of 1 year of abdominal distension (11 Jul 2023 15:34)      INTERVAL /OVERNIGHT EVENTS: less disteneded    MEDICATIONS  (STANDING):  amLODIPine   Tablet 5 milliGRAM(s) Oral daily  lamoTRIgine 200 milliGRAM(s) Oral daily  liothyronine 200 MICROGram(s) Oral daily  lithium CR (ESKALITH-CR) 450 milliGRAM(s) Oral at bedtime  polyethylene glycol 3350 17 Gram(s) Oral every 12 hours  QUEtiapine 50 milliGRAM(s) Oral at bedtime  sodium chloride 0.9%. 1000 milliLiter(s) (100 mL/Hr) IV Continuous <Continuous>    MEDICATIONS  (PRN):  ondansetron Injectable 4 milliGRAM(s) IV Push four times a day PRN Nausea and/or Vomiting      Allergies    No Known Allergies    Intolerances        REVIEW OF SYSTEMS:  CONSTITUTIONAL: No fever, weight loss, or fatigue  EYES: No eye pain, visual disturbances, or discharge  ENMT:  No difficulty hearing, tinnitus, vertigo; No sinus or throat pain  NECK: No pain or stiffness  RESPIRATORY: No cough, wheezing, chills or hemoptysis; No shortness of breath  CARDIOVASCULAR: No chest pain, palpitations, dizziness, or leg swelling  GASTROINTESTINAL: No abdominal or epigastric pain. No nausea, vomiting, or hematemesis; No diarrhea or constipation. No melena or hematochezia.  GENITOURINARY: No dysuria, frequency, hematuria, or incontinence  NEUROLOGICAL: No headaches, memory loss, loss of strength, numbness, or tremors  SKIN: No itching, burning, rashes, or lesions   LYMPH NODES: No enlarged glands  ENDOCRINE: No heat or cold intolerance; No hair loss; No polydipsia or polyuria  MUSCULOSKELETAL: No joint pain or swelling; No muscle, back, or extremity pain  PSYCHIATRIC: No depression, anxiety, mood swings, or difficulty sleeping  HEME/LYMPH: No easy bruising, or bleeding gums  ALLERGY AND IMMUNOLOGIC: No hives or eczema    Vital Signs Last 24 Hrs  T(C): 36.6 (11 Jul 2023 13:03), Max: 37.1 (11 Jul 2023 09:55)  T(F): 97.9 (11 Jul 2023 13:03), Max: 98.8 (11 Jul 2023 09:55)  HR: 80 (11 Jul 2023 13:03) (80 - 109)  BP: 169/83 (11 Jul 2023 13:03) (142/79 - 188/95)  BP(mean): --  RR: 18 (11 Jul 2023 13:03) (16 - 20)  SpO2: 98% (11 Jul 2023 13:03) (96% - 99%)    Parameters below as of 11 Jul 2023 13:03  Patient On (Oxygen Delivery Method): room air        PHYSICAL EXAM:  GENERAL: NAD, well-groomed, well-developed  HEAD:  Atraumatic, Normocephalic  EYES: EOMI, PERRLA, conjunctiva and sclera clear  ENMT: No tonsillar erythema, exudates, or enlargement; Moist mucous membranes, Good dentition, No lesions  NECK: Supple, No JVD, Normal thyroid  NERVOUS SYSTEM:  Alert & Oriented X3, Good concentration; Motor Strength 5/5 B/L upper and lower extremities; DTRs 2+ intact and symmetric  CHEST/LUNG: Clear to auscultation bilaterally; No rales, rhonchi, wheezing, or rubs  HEART: Regular rate and rhythm; No murmurs, rubs, or gallops  ABDOMEN: Soft, Nontender, Nondistended; Bowel sounds present  EXTREMITIES:  2+ Peripheral Pulses, No clubbing, cyanosis, or edema  LYMPH: No lymphadenopathy noted  SKIN: No rashes or lesions    LABS:                        11.6   6.20  )-----------( 179      ( 11 Jul 2023 06:16 )             35.8     11 Jul 2023 06:16    142    |  113    |  21     ----------------------------<  66     3.9     |  24     |  0.71     Ca    11.7       11 Jul 2023 06:16  Phos  2.2       11 Jul 2023 06:16  Mg     1.8       11 Jul 2023 06:16      PT/INR - ( 09 Jul 2023 23:55 )   PT: 15.6 sec;   INR: 1.33 ratio         PTT - ( 09 Jul 2023 23:55 )  PTT:28.7 sec  Urinalysis Basic - ( 11 Jul 2023 06:16 )    Color: x / Appearance: x / SG: x / pH: x  Gluc: 66 mg/dL / Ketone: x  / Bili: x / Urobili: x   Blood: x / Protein: x / Nitrite: x   Leuk Esterase: x / RBC: x / WBC x   Sq Epi: x / Non Sq Epi: x / Bacteria: x      CAPILLARY BLOOD GLUCOSE      POCT Blood Glucose.: 85 mg/dL (11 Jul 2023 11:19)      RADIOLOGY & ADDITIONAL TESTS:    Notes Reviewed:  [x ] YES  [ ] NO    Care Discussed with Consultants/Other Providers [x ] YES  [ ] NO
Patient is a 57y old  Male who presents with a chief complaint of sigmoid distension (13 Jul 2023 08:44)    Patient seen in follow up for hypercalcemia       PAST MEDICAL HISTORY:  Chronic constipation    HLD (hyperlipidemia)    Obesity    Anxiety    Prediabetes    HTN (hypertension)    CAD (coronary artery disease)      MEDICATIONS  (STANDING):  amLODIPine   Tablet 10 milliGRAM(s) Oral every 24 hours  aspirin enteric coated 81 milliGRAM(s) Oral daily  lamoTRIgine 200 milliGRAM(s) Oral daily  liothyronine 200 MICROGram(s) Oral daily  lithium CR (ESKALITH-CR) 450 milliGRAM(s) Oral at bedtime  polyethylene glycol 3350 17 Gram(s) Oral every 12 hours  QUEtiapine 50 milliGRAM(s) Oral at bedtime    MEDICATIONS  (PRN):  acetaminophen     Tablet .. 650 milliGRAM(s) Oral every 6 hours PRN Temp greater or equal to 38C (100.4F), Mild Pain (1 - 3)  ondansetron Injectable 4 milliGRAM(s) IV Push four times a day PRN Nausea and/or Vomiting    T(C): 36.8 (07-13-23 @ 11:27), Max: 36.8 (07-13-23 @ 11:27)  HR: 74 (07-13-23 @ 11:27) (74 - 85)  BP: 126/65 (07-13-23 @ 11:27) (121/64 - 148/79)  RR: 19 (07-13-23 @ 11:27) (14 - 19)  SpO2: 97% (07-13-23 @ 11:27) (95% - 98%)  Wt(kg): --  I&O's Detail    12 Jul 2023 07:01  -  13 Jul 2023 07:00  --------------------------------------------------------  IN:    Oral Fluid: 150 mL  Total IN: 150 mL    OUT:  Total OUT: 0 mL    Total NET: 150 mL          PHYSICAL EXAM:  General: No distress  Respiratory: b/l air entry  Cardiovascular: S1 S2  Gastrointestinal: soft  Extremities:  edema                              11.1   5.11  )-----------( 168      ( 13 Jul 2023 07:40 )             34.8     07-13    144  |  115<H>  |  14  ----------------------------<  90  3.6   |  25  |  0.67    Ca    11.1<H>      13 Jul 2023 07:40  Phos  3.0     07-12  Mg     1.6     07-12    TPro  6.0  /  Alb  3.0<L>  /  TBili  1.1  /  DBili  x   /  AST  17  /  ALT  21  /  AlkPhos  102  07-12        LIVER FUNCTIONS - ( 12 Jul 2023 09:05 )  Alb: 3.0 g/dL / Pro: 6.0 g/dL / ALK PHOS: 102 U/L / ALT: 21 U/L / AST: 17 U/L / GGT: x           Urinalysis Basic - ( 13 Jul 2023 07:40 )    Color: x / Appearance: x / SG: x / pH: x  Gluc: 90 mg/dL / Ketone: x  / Bili: x / Urobili: x   Blood: x / Protein: x / Nitrite: x   Leuk Esterase: x / RBC: x / WBC x   Sq Epi: x / Non Sq Epi: x / Bacteria: x        Sodium, Serum: 144 (07-13 @ 07:40)  Sodium, Serum: 143 (07-12 @ 09:05)  Sodium, Serum: 142 (07-11 @ 06:16)  Sodium, Serum: 141 (07-09 @ 23:55)    Creatinine, Serum: 0.67 (07-13 @ 07:40)  Creatinine, Serum: 0.73 (07-12 @ 09:05)  Creatinine, Serum: 0.71 (07-11 @ 06:16)  Creatinine, Serum: 1.10 (07-09 @ 23:55)    Potassium, Serum: 3.6 (07-13 @ 07:40)  Potassium, Serum: 3.5 (07-12 @ 09:05)  Potassium, Serum: 3.9 (07-11 @ 06:16)  Potassium, Serum: 4.0 (07-09 @ 23:55)    Hemoglobin: 11.1 (07-13 @ 07:40)  Hemoglobin: 11.0 (07-12 @ 09:05)  Hemoglobin: 11.6 (07-11 @ 06:16)  Hemoglobin: 11.3 (07-09 @ 23:55)    
[facility] -- [place] Lee, Dany Clark Savella, Goodger  Office # [phone]    Follow Up:    abdominal distension, awaiting colonoscopy  Subjective/Observations:   Patient seen and examined. He has no reports of chest pain or shortness of breath. He denies palpitations or PND. No BALDWIN. He endorses abdominal distension and is aware that he needs a colonoscopy today.    REVIEW OF SYSTEMS: All other review of systems is negative unless indicated above  PAST MEDICAL & SURGICAL HISTORY:  Chronic constipation  HLD (hyperlipidemia)  Obesity  Anxiety  Prediabetes  HTN (hypertension)  CAD (coronary artery disease)  S/P coronary artery stent placement    MEDICATIONS  (STANDING):  amLODIPine   Tablet 2.5 milliGRAM(s) Oral every 24 hours  lamoTRIgine 200 milliGRAM(s) Oral daily  liothyronine 200 MICROGram(s) Oral daily  lithium CR (ESKALITH-CR) 450 milliGRAM(s) Oral at bedtime  QUEtiapine 50 milliGRAM(s) Oral at bedtime    MEDICATIONS  (PRN):  ondansetron Injectable 4 milliGRAM(s) IV Push four times a day PRN Nausea and/or Vomiting    Allergies    No Known Allergies    Intolerances      Vital Signs Last 24 Hrs  T(C): 37.1 (11 Jul 2023 09:55), Max: 37.1 (11 Jul 2023 09:55)  T(F): 98.8 (11 Jul 2023 09:55), Max: 98.8 (11 Jul 2023 09:55)  HR: 102 (11 Jul 2023 09:55) (80 - 109)  BP: 161/83 (11 Jul 2023 09:55) (142/79 - 188/95)  BP(mean): --  RR: 16 (11 Jul 2023 09:55) (16 - 20)  SpO2: 99% (11 Jul 2023 09:55) (96% - 99%)    Parameters below as of 11 Jul 2023 09:55  Patient On (Oxygen Delivery Method): room air      I&O's Summary      PHYSICAL EXAM:  TELE: off  Constitutional: NAD, awake and alert, well-developed  HEENT: Moist Mucous Membranes, Anicteric  Pulmonary: Non-labored, breath sounds are clear bilaterally, No wheezing, rales or rhonchi  Cardiovascular: Regular, S1 and S2, No murmurs, rubs, gallops or clicks  Gastrointestinal: Bowel Sounds present, soft, nontender.   Lymph: No peripheral edema. No lymphadenopathy.  Skin: No visible rashes or ulcers.  Psych:  Mood & affect appropriate  LABS: All Labs Reviewed:                        11.6   6.20  )-----------( 179      ( 11 Jul 2023 06:16 )             35.8                         11.3   4.78  )-----------( 183      ( 09 Jul 2023 23:55 )             34.9     11 Jul 2023 06:16    142    |  113    |  21     ----------------------------<  66     3.9     |  24     |  0.71   09 Jul 2023 23:55    141    |  110    |  26     ----------------------------<  121    4.0     |  28     |  1.10     Ca    11.7       11 Jul 2023 06:16  Ca    12.0       10 Jul 2023 15:31  Ca    11.9       09 Jul 2023 23:55    TPro  6.3    /  Alb  3.1    /  TBili  0.6    /  DBili  x      /  AST  17     /  ALT  19     /  AlkPhos  121    09 Jul 2023 23:55    PT/INR - ( 09 Jul 2023 23:55 )   PT: 15.6 sec;   INR: 1.33 ratio         PTT - ( 09 Jul 2023 23:55 )  PTT:28.7 sec         
s/p flex sig    dilated colon  poor prep  no evidence of voluvulous    full colonoscopy could not be completed 2/2 poor prep and distended colon    rec:   soft diet  miralax bid  will need screening colonoscopy as outpatient with 2 day prep  d/w patient 
Renal consult called for hypercalcemia. Chart reviewed. Likely lithium induced hypercalcemia.   Labs as ordered. IV hydration. Full consult to follow. 
[facility] -- King Lee Pannella, Patel, Savella, Goodger  Office # [phone]    Follow Up:  Cardiac optimization     Subjective/Observations: Patient seen and examined, awake, alert, eating breakfast in bed, denies chest pain, dyspnea, palpitations or dizziness, orthopnea and PND. Tolerating room air.    REVIEW OF SYSTEMS: All other review of systems is negative unless indicated above  PAST MEDICAL & SURGICAL HISTORY:  Chronic constipation      HLD (hyperlipidemia)      Obesity      Anxiety      Prediabetes      HTN (hypertension)      CAD (coronary artery disease)      S/P coronary artery stent placement        MEDICATIONS  (STANDING):  amLODIPine   Tablet 10 milliGRAM(s) Oral every 24 hours  aspirin enteric coated 81 milliGRAM(s) Oral daily  lamoTRIgine 200 milliGRAM(s) Oral daily  liothyronine 200 MICROGram(s) Oral daily  lithium CR (ESKALITH-CR) 450 milliGRAM(s) Oral at bedtime  polyethylene glycol 3350 17 Gram(s) Oral every 12 hours  QUEtiapine 50 milliGRAM(s) Oral at bedtime    MEDICATIONS  (PRN):  acetaminophen     Tablet .. 650 milliGRAM(s) Oral every 6 hours PRN Temp greater or equal to 38C (100.4F), Mild Pain (1 - 3)  ondansetron Injectable 4 milliGRAM(s) IV Push four times a day PRN Nausea and/or Vomiting    Allergies    No Known Allergies    Intolerances      Vital Signs Last 24 Hrs  T(C): 36.8 (13 Jul 2023 11:27), Max: 36.8 (13 Jul 2023 11:27)  T(F): 98.2 (13 Jul 2023 11:27), Max: 98.2 (13 Jul 2023 11:27)  HR: 74 (13 Jul 2023 11:27) (74 - 85)  BP: 126/65 (13 Jul 2023 11:27) (121/64 - 148/79)  BP(mean): --  RR: 19 (13 Jul 2023 11:27) (18 - 19)  SpO2: 97% (13 Jul 2023 11:27) (97% - 98%)    Parameters below as of 13 Jul 2023 11:27  Patient On (Oxygen Delivery Method): room air      I&O's Summary    12 Jul 2023 07:01  -  13 Jul 2023 07:00  --------------------------------------------------------  IN: 150 mL / OUT: 0 mL / NET: 150 mL        TELE: Not on telemetry   PHYSICAL EXAM:  Constitutional: NAD, awake and alert  HEENT: Moist Mucous Membranes, Anicteric  Pulmonary: Non-labored, breath sounds are clear bilaterally, No wheezing, rales or rhonchi  Cardiovascular: Regular, S1 and S2, No murmurs, rubs, gallops or clicks  Gastrointestinal: Bowel Sounds present, soft, nontender.   Lymph: No peripheral edema. No lymphadenopathy. distended   Skin: No visible rashes or ulcers.  Psych:  Mood & affect appropriate  LABS: All Labs Reviewed:                        11.1   5.11  )-----------( 168      ( 13 Jul 2023 07:40 )             34.8                         11.0   4.63  )-----------( 164      ( 12 Jul 2023 09:05 )             33.1                         11.6   6.20  )-----------( 179      ( 11 Jul 2023 06:16 )             35.8     13 Jul 2023 07:40    144    |  115    |  14     ----------------------------<  90     3.6     |  25     |  0.67   12 Jul 2023 09:05    143    |  114    |  14     ----------------------------<  131    3.5     |  24     |  0.73   11 Jul 2023 06:16    142    |  113    |  21     ----------------------------<  66     3.9     |  24     |  0.71     Ca    11.1       13 Jul 2023 07:40  Ca    10.8       12 Jul 2023 09:05  Ca    11.7       11 Jul 2023 06:16  Phos  3.0       12 Jul 2023 09:05  Phos  2.2       11 Jul 2023 06:16  Mg     1.6       12 Jul 2023 09:05  Mg     1.8       11 Jul 2023 06:16    TPro  6.0    /  Alb  3.0    /  TBili  1.1    /  DBili  x      /  AST  17     /  ALT  21     /  AlkPhos  102    12 Jul 2023 09:05          12 Lead ECG:   Ventricular Rate 80 BPM    Atrial Rate 80 BPM    P-R Interval 258 ms    QRS Duration 120 ms    Q-T Interval 378 ms    QTC Calculation(Bazett) 435 ms    P Axis 15 degrees    R Axis -7 degrees    T Axis 10 degrees    Diagnosis Line Sinus rhythm with 1st degree AV block  Minimal voltage criteria for LVH, may be normal variant ( [place] product )  Nonspecific T wave abnormality  Abnormal ECG  No previous ECGs available  Confirmed by SUSHIL PHAM ([id number]) on 7/10/2023 7:30:34 PM (07-09-23 @ 23:59)       
[facility]  Umang Nelson PA-C  [address] [place] [address]  [phone]      INTERVAL HPI/OVERNIGHT EVENTS:  Pt s/e with sister Roya at bedside and sister Karina on phone  S/p flex sig d/w pt  Pt denies abdominal pain or N/V/D    MEDICATIONS  (STANDING):  amLODIPine   Tablet 10 milliGRAM(s) Oral every 24 hours  aspirin enteric coated 81 milliGRAM(s) Oral daily  lamoTRIgine 200 milliGRAM(s) Oral daily  liothyronine 200 MICROGram(s) Oral daily  lithium CR (ESKALITH-CR) 450 milliGRAM(s) Oral at bedtime  polyethylene glycol 3350 17 Gram(s) Oral every 12 hours  QUEtiapine 50 milliGRAM(s) Oral at bedtime  sodium chloride 0.9%. 1000 milliLiter(s) (100 mL/Hr) IV Continuous <Continuous>    MEDICATIONS  (PRN):  acetaminophen     Tablet .. 650 milliGRAM(s) Oral every 6 hours PRN Temp greater or equal to 38C (100.4F), Mild Pain (1 - 3)  ondansetron Injectable 4 milliGRAM(s) IV Push four times a day PRN Nausea and/or Vomiting      Allergies    No Known Allergies      PHYSICAL EXAM:   Vital Signs:  Vital Signs Last 24 Hrs  T(C): 36.6 (12 Jul 2023 04:28), Max: 37 (11 Jul 2023 20:55)  T(F): 97.9 (12 Jul 2023 04:28), Max: 98.6 (11 Jul 2023 20:55)  HR: 81 (12 Jul 2023 04:28) (80 - 90)  BP: 138/77 (12 Jul 2023 04:28) (138/77 - 169/83)  BP(mean): --  RR: 14 (12 Jul 2023 04:28) (14 - 18)  SpO2: 95% (12 Jul 2023 04:28) (95% - 98%)    Parameters below as of 12 Jul 2023 04:28  Patient On (Oxygen Delivery Method): room air    GENERAL:  Appears stated age  HEENT:  NC/AT  CHEST:  Full & symmetric excursion  HEART:  Regular rhythm  ABDOMEN:  Soft, non-tender, non-distended  EXTEREMITIES:  no cyanosis  SKIN:  No rash  NEURO:  Alert      LABS:                        11.0   4.63  )-----------( 164      ( 12 Jul 2023 09:05 )             33.1     07-12    143  |  114<H>  |  14  ----------------------------<  131<H>  3.5   |  24  |  0.73    Ca    10.8<H>      12 Jul 2023 09:05  Phos  3.0     07-12  Mg     1.6     07-12    TPro  6.0  /  Alb  3.0<L>  /  TBili  1.1  /  DBili  x   /  AST  17  /  ALT  21  /  AlkPhos  102  07-12      Urinalysis Basic - ( 12 Jul 2023 09:05 )    Color: x / Appearance: x / SG: x / pH: x  Gluc: 131 mg/dL / Ketone: x  / Bili: x / Urobili: x   Blood: x / Protein: x / Nitrite: x   Leuk Esterase: x / RBC: x / WBC x   Sq Epi: x / Non Sq Epi: x / Bacteria: x
Pt seen and examined at bedside, denies any overnight events. Tolerating diet. Denies any abd pain, nausea, vomiting, chest pain, palpitations, shortness of breath.    T(C): 36.6 (07-12-23 @ 04:28), Max: 37.1 (07-11-23 @ 09:55)  HR: 81 (07-12-23 @ 04:28) (80 - 102)  BP: 138/77 (07-12-23 @ 04:28) (138/77 - 169/83)  RR: 14 (07-12-23 @ 04:28) (14 - 18)  SpO2: 95% (07-12-23 @ 04:28) (95% - 99%)    PHYSICAL EXAM:  General: no acute distress, appears comfortable  HEENT: normocephalic, anicteric  Pulm: non labored respirations  Cardio: RRR  Abdomen: soft, nontender, nondistended  Extremities: no calf tenderness noted    I&O's Detail    11 Jul 2023 07:01  -  12 Jul 2023 07:00  --------------------------------------------------------  IN:    sodium chloride 0.9%: 800 mL  Total IN: 800 mL    OUT:  Total OUT: 0 mL    Total NET: 800 mL    LABS:                        11.6   6.20  )-----------( 179      ( 11 Jul 2023 06:16 )             35.8     07-11    142  |  113<H>  |  21  ----------------------------<  66<L>  3.9   |  24  |  0.71    Ca    11.7<H>      11 Jul 2023 06:16  Phos  2.2     07-11  Mg     1.8     07-11    Urinalysis Basic - ( 11 Jul 2023 06:16 )    Color: x / Appearance: x / SG: x / pH: x  Gluc: 66 mg/dL / Ketone: x  / Bili: x / Urobili: x   Blood: x / Protein: x / Nitrite: x   Leuk Esterase: x / RBC: x / WBC x   Sq Epi: x / Non Sq Epi: x / Bacteria: x    CAPILLARY BLOOD GLUCOSE  POCT Blood Glucose.: 85 mg/dL (11 Jul 2023 11:19)      MEDICATIONS:  acetaminophen     Tablet .. 650 milliGRAM(s) Oral every 6 hours PRN  amLODIPine   Tablet 10 milliGRAM(s) Oral every 24 hours  lamoTRIgine 200 milliGRAM(s) Oral daily  liothyronine 200 MICROGram(s) Oral daily  lithium CR (ESKALITH-CR) 450 milliGRAM(s) Oral at bedtime  ondansetron Injectable 4 milliGRAM(s) IV Push four times a day PRN  polyethylene glycol 3350 17 Gram(s) Oral every 12 hours  QUEtiapine 50 milliGRAM(s) Oral at bedtime  sodium chloride 0.9%. 1000 milliLiter(s) IV Continuous <Continuous>    
[facility] -- King Lee Pannella, Patel, Savella, Goodger  Office # [phone]    Follow Up:  Cardiac optimization     Subjective/Observations: Patient seen and examined, awake, alert, resting comfortably in bed, denies chest pain, dyspnea, palpitations or dizziness, orthopnea and PND. Tolerating room air.    REVIEW OF SYSTEMS: All other review of systems is negative unless indicated above  PAST MEDICAL & SURGICAL HISTORY:  Chronic constipation      HLD (hyperlipidemia)      Obesity      Anxiety      Prediabetes      HTN (hypertension)      CAD (coronary artery disease)      S/P coronary artery stent placement        MEDICATIONS  (STANDING):  amLODIPine   Tablet 10 milliGRAM(s) Oral every 24 hours  lamoTRIgine 200 milliGRAM(s) Oral daily  liothyronine 200 MICROGram(s) Oral daily  lithium CR (ESKALITH-CR) 450 milliGRAM(s) Oral at bedtime  polyethylene glycol 3350 17 Gram(s) Oral every 12 hours  QUEtiapine 50 milliGRAM(s) Oral at bedtime  sodium chloride 0.9%. 1000 milliLiter(s) (100 mL/Hr) IV Continuous <Continuous>    MEDICATIONS  (PRN):  acetaminophen     Tablet .. 650 milliGRAM(s) Oral every 6 hours PRN Temp greater or equal to 38C (100.4F), Mild Pain (1 - 3)  ondansetron Injectable 4 milliGRAM(s) IV Push four times a day PRN Nausea and/or Vomiting    Allergies    No Known Allergies    Intolerances      Vital Signs Last 24 Hrs  T(C): 36.6 (12 Jul 2023 04:28), Max: 37 (11 Jul 2023 20:55)  T(F): 97.9 (12 Jul 2023 04:28), Max: 98.6 (11 Jul 2023 20:55)  HR: 81 (12 Jul 2023 04:28) (80 - 90)  BP: 138/77 (12 Jul 2023 04:28) (138/77 - 169/83)  BP(mean): --  RR: 14 (12 Jul 2023 04:28) (14 - 18)  SpO2: 95% (12 Jul 2023 04:28) (95% - 98%)    Parameters below as of 12 Jul 2023 04:28  Patient On (Oxygen Delivery Method): room air      I&O's Summary    11 Jul 2023 07:01  -  12 Jul 2023 07:00  --------------------------------------------------------  IN: 800 mL / OUT: 0 mL / NET: 800 mL        TELE: Not on telemetry   PHYSICAL EXAM:  Constitutional: NAD, awake and alert  HEENT: Moist Mucous Membranes, Anicteric  Pulmonary: Non-labored, breath sounds are clear bilaterally, No wheezing, rales or rhonchi  Cardiovascular: Regular, S1 and S2, No murmurs, rubs, gallops or clicks  Gastrointestinal: Bowel Sounds present, soft, nontender. distended   Lymph: No peripheral edema. No lymphadenopathy.  Skin: No visible rashes or ulcers.  Psych:  Mood & affect appropriate  LABS: All Labs Reviewed:                        11.0   4.63  )-----------( 164      ( 12 Jul 2023 09:05 )             33.1                         11.6   6.20  )-----------( 179      ( 11 Jul 2023 06:16 )             35.8                         11.3   4.78  )-----------( 183      ( 09 Jul 2023 23:55 )             34.9     12 Jul 2023 09:05    143    |  114    |  14     ----------------------------<  131    3.5     |  24     |  0.73   11 Jul 2023 06:16    142    |  113    |  21     ----------------------------<  66     3.9     |  24     |  0.71   09 Jul 2023 23:55    141    |  110    |  26     ----------------------------<  121    4.0     |  28     |  1.10     Ca    10.8       12 Jul 2023 09:05  Ca    11.7       11 Jul 2023 06:16  Ca    12.0       10 Jul 2023 15:31  Phos  2.2       11 Jul 2023 06:16  Mg     1.6       12 Jul 2023 09:05  Mg     1.8       11 Jul 2023 06:16    TPro  x      /  Alb  3.0    /  TBili  x      /  DBili  x      /  AST  x      /  ALT  x      /  AlkPhos  x      12 Jul 2023 09:05  TPro  6.3    /  Alb  3.1    /  TBili  0.6    /  DBili  x      /  AST  17     /  ALT  19     /  AlkPhos  121    09 Jul 2023 23:55          12 Lead ECG:   Ventricular Rate 80 BPM    Atrial Rate 80 BPM    P-R Interval 258 ms    QRS Duration 120 ms    Q-T Interval 378 ms    QTC Calculation(Bazett) 435 ms    P Axis 15 degrees    R Axis -7 degrees    T Axis 10 degrees    Diagnosis Line Sinus rhythm with 1st degree AV block  Minimal voltage criteria for LVH, may be normal variant ( [place] product )  Nonspecific T wave abnormality  Abnormal ECG  No previous ECGs available  Confirmed by SUSHIL PHAM ([id number]) on 7/10/2023 7:30:34 PM (07-09-23 @ 23:59)       
Patient is a 56yo male with a PMH of HTN, HLD, CAD s/p PCIx3 stents, Bipolar disorder and failure to thrive for the past 6 months. He presented to the ED with approximately 1 year of abdominal distension that he reports has improved recently. Pt also reports gradual onset of weakness, dizziness and approximately 40lb weight loss during the past 6 months. He is nontender, distended and tympanitic on exam. He has had multiple watery episodes of diarrhea overnight that have been lightening in color. He was NPO overnight in preparation for colonoscopy with GI today. No fever, chills, or abdominal pain.     ROS: negative unless otherwise noted in HPI
Pt seen and examined at bedside this morning. No abdominal pain, tolerating diet, has passed flatus and BM. No nausea or vomiting. Spoke with SW yesterday about thoughts of SI. Has since been seen and cleared by Psych for discharge.     Objective    Vital Signs Last 24 Hrs  T(C): 36.4 (13 Jul 2023 04:21), Max: 36.7 (12 Jul 2023 20:34)  T(F): 97.5 (13 Jul 2023 04:21), Max: 98.1 (12 Jul 2023 20:34)  HR: 78 (13 Jul 2023 04:21) (78 - 85)  BP: 148/79 (13 Jul 2023 04:21) (134/72 - 148/79)  BP(mean): --  RR: 18 (13 Jul 2023 04:21) (16 - 18)  SpO2: 98% (13 Jul 2023 04:21) (97% - 98%)    Parameters below as of 13 Jul 2023 04:21  Patient On (Oxygen Delivery Method): room air  
Patient is a 57y old  Male who presents with a chief complaint of 1 year of abdominal distension (12 Jul 2023 12:25)  less abd distention      INTERVAL HPI/OVERNIGHT EVENTS:  T(C): 36.6 (07-12-23 @ 12:42), Max: 37 (07-11-23 @ 20:55)  HR: 85 (07-12-23 @ 12:42) (81 - 90)  BP: 134/72 (07-12-23 @ 12:42) (134/72 - 168/66)  RR: 16 (07-12-23 @ 12:42) (14 - 18)  SpO2: 98% (07-12-23 @ 12:42) (95% - 98%)  Wt(kg): --  I&O's Summary    11 Jul 2023 07:01  -  12 Jul 2023 07:00  --------------------------------------------------------  IN: 800 mL / OUT: 0 mL / NET: 800 mL    12 Jul 2023 07:01  -  12 Jul 2023 14:52  --------------------------------------------------------  IN: 150 mL / OUT: 0 mL / NET: 150 mL        LABS:                        11.0   4.63  )-----------( 164      ( 12 Jul 2023 09:05 )             33.1     07-12    143  |  114<H>  |  14  ----------------------------<  131<H>  3.5   |  24  |  0.73    Ca    10.8<H>      12 Jul 2023 09:05  Phos  3.0     07-12  Mg     1.6     07-12    TPro  6.0  /  Alb  3.0<L>  /  TBili  1.1  /  DBili  x   /  AST  17  /  ALT  21  /  AlkPhos  102  07-12      Urinalysis Basic - ( 12 Jul 2023 09:05 )    Color: x / Appearance: x / SG: x / pH: x  Gluc: 131 mg/dL / Ketone: x  / Bili: x / Urobili: x   Blood: x / Protein: x / Nitrite: x   Leuk Esterase: x / RBC: x / WBC x   Sq Epi: x / Non Sq Epi: x / Bacteria: x      CAPILLARY BLOOD GLUCOSE            Urinalysis Basic - ( 12 Jul 2023 09:05 )    Color: x / Appearance: x / SG: x / pH: x  Gluc: 131 mg/dL / Ketone: x  / Bili: x / Urobili: x   Blood: x / Protein: x / Nitrite: x   Leuk Esterase: x / RBC: x / WBC x   Sq Epi: x / Non Sq Epi: x / Bacteria: x        MEDICATIONS  (STANDING):  amLODIPine   Tablet 10 milliGRAM(s) Oral every 24 hours  aspirin enteric coated 81 milliGRAM(s) Oral daily  lamoTRIgine 200 milliGRAM(s) Oral daily  liothyronine 200 MICROGram(s) Oral daily  lithium CR (ESKALITH-CR) 450 milliGRAM(s) Oral at bedtime  polyethylene glycol 3350 17 Gram(s) Oral every 12 hours  QUEtiapine 50 milliGRAM(s) Oral at bedtime  sodium chloride 0.9%. 1000 milliLiter(s) (100 mL/Hr) IV Continuous <Continuous>    MEDICATIONS  (PRN):  acetaminophen     Tablet .. 650 milliGRAM(s) Oral every 6 hours PRN Temp greater or equal to 38C (100.4F), Mild Pain (1 - 3)  ondansetron Injectable 4 milliGRAM(s) IV Push four times a day PRN Nausea and/or Vomiting      REVIEW OF SYSTEMS:  CONSTITUTIONAL: weight loss  EYES: No eye pain, visual disturbances, or discharge  ENMT:  No difficulty hearing, tinnitus, vertigo; No sinus or throat pain  NECK: No pain or stiffness  RESPIRATORY: No cough, wheezing, chills or hemoptysis; No shortness of breath  CARDIOVASCULAR: No chest pain, palpitations, dizziness, or leg swelling  GASTROINTESTINAL: No abdominal or epigastric pain. No nausea, vomiting, or hematemesis; No diarrhea or constipation. No melena or hematochezia.  GENITOURINARY: No dysuria, frequency, hematuria, or incontinence  NEUROLOGICAL: No headaches, memory loss, loss of strength, numbness, or tremors  SKIN: No itching, burning, rashes, or lesions   LYMPH NODES: No enlarged glands  ENDOCRINE: No heat or cold intolerance; No hair loss  MUSCULOSKELETAL: No joint pain or swelling; No muscle, back, or extremity pain  PSYCHIATRIC: No depression, anxiety, mood swings, or difficulty sleeping  HEME/LYMPH: No easy bruising, or bleeding gums  ALLERY AND IMMUNOLOGIC: No hives or eczema    RADIOLOGY & ADDITIONAL TESTS:    Imaging Personally Reviewed:  [x ] YES  [ ] NO    Consultant(s) Notes Reviewed:  [x ] YES  [ ] NO    PHYSICAL EXAM:  GENERAL: NAD, well-groomed, well-developed  HEAD:  Atraumatic, Normocephalic  EYES: EOMI, PERRLA, conjunctiva and sclera clear  ENMT: No tonsillar erythema, exudates, or enlargement; Moist mucous membranes, Good dentition, No lesions  NECK: Supple, No JVD, Normal thyroid  NERVOUS SYSTEM:  Alert & Oriented X3, Good concentration; Motor Strength 5/5 B/L upper and lower extremities; DTRs 2+ intact and symmetric  CHEST/LUNG: Clear to percussion bilaterally; No rales, rhonchi, wheezing, or rubs  HEART: Regular rate and rhythm; No murmurs, rubs, or gallops  ABDOMEN: Soft, Nontender, Nondistended; Bowel sounds present  EXTREMITIES:  2+ Peripheral Pulses, No clubbing, cyanosis, or edema  LYMPH: No lymphadenopathy noted  SKIN: No rashes or lesions    Care Discussed with Consultants/Other Providers [ x] YES  [ ] NO    advance care planning and advance directives discussed, including but not limited to long term care planning, and all forms reviewed [x]YES

## 2023-07-13 NOTE — OCCUPATIONAL THERAPY INITIAL EVALUATION ADULT - ADL RETRAINING, OT EVAL
Patient will don/doff button down shirt using assistive devices if needed with no assistance in 2-4 sessions.

## 2023-07-13 NOTE — PROGRESS NOTE ADULT - GASTROINTESTINAL
tympanitic/nontender/no guarding/no rigidity/distended
tympanitic/soft/nontender/no guarding/no rigidity/distended

## 2023-07-13 NOTE — OCCUPATIONAL THERAPY INITIAL EVALUATION ADULT - PERTINENT HX OF CURRENT PROBLEM, REHAB EVAL
56 y/o male with  PMH of HTN, HLD, CAD s/p PCIx3 stents, Bipolar disorder and FTT, presenting w/ 1 year of abdominal distension (improving) & weight loss. GI & surg reviewed CT, found no swirling to suggest volvulus. S/p colonoscopy 7/11 - incomplete 2/2 poor bowel prep; remains surgically stable. Cleared for discharge from Psych standpoint; no plan to act on thoughts, has good social support. Abdomen soft, nontender and distended. Follow up outpatient for colonoscopy with GI. Patient admitted with intestinal volvulus.

## 2023-07-13 NOTE — PROGRESS NOTE ADULT - PROVIDER SPECIALTY LIST ADULT
Nephrology
Surgery
Cardiology
Gastroenterology
Gastroenterology
Surgery
Cardiology
Cardiology
Family Medicine
Nephrology
Surgery
Hospitalist

## 2023-07-13 NOTE — PROGRESS NOTE ADULT - NS ATTEND AMEND GEN_ALL_CORE FT
-there is no evidence of acute ischemia.  -there is no evidence of significant arrhythmia.  -there is no evidence for meaningful  volume overload.   -not planning to redo colonoscopy  -mgmt per primary team
Patient is a 56yo male with a PMH of HTN, HLD, CAD s/p PCIx3 stents in 2016, Bipolar disorder and failure to thrive for the past 6 months. Scheduled for colonoscopy with GI tomorrow.     CAD s/p PCI 3 stents in 2016  -should be on aspirin daily, would add on after the colonoscopy  -HR is elevated according to the flow sheets,   EKG shows NSR with first degree HB on admission (new from previous)  -would hold off on BB, but consider monitoring on tele if HR remains elevated   - No signs of significant ischemia or volume overload.     BP is elevated  -increased amlodipine to 5mg po qd    - RCRI score 6% class II risk  - Mets 3, patient has difficulty walking long distance or climbing up stairs   - Pt is optimized from cardiovascular standpoint to proceed with planned procedure with routine hemodynamic monitoring.
I have personally seen and examined the patient in detail.  I have spoken to the provider regarding the assessment and plan of care.  I have made changes to the note accordingly.

## 2023-07-13 NOTE — CHART NOTE - NSCHARTNOTEFT_GEN_A_CORE

## 2023-07-13 NOTE — OCCUPATIONAL THERAPY INITIAL EVALUATION ADULT - ADDITIONAL COMMENTS
Pt lives in a 2 level house with his 94 y/o father with no steps to enter, 1 flight to bedroom. Pt has a bathtub with doors. Pt reports difficulty with performing ADLs while standing and difficulty with buttons. Pt instructed in safe ADL techniques (sitting while performing lower body dressing using sock aide and dressing stick); patient appeared to have a good understanding of ADL techniques. Pt reports difficulty getting up/down from toilet though he is able to do so using a raised toilet seat.

## 2023-07-13 NOTE — GOALS OF CARE CONVERSATION - ADVANCED CARE PLANNING - CONVERSATION DETAILS
Palliative care SW met with patient at bedside to discuss advanced care planning. Reviewed patient's medical and social history as well as events leading to patient's hospitalization. Writer discussed patient's current diagnosis (Abdominal distension; HX of HTN, HLD, CAD s/p PCIx3 stents, Bipolar disorder and failure to thrive), medical condition and management. Inquired about advanced directives. Patient states he does not have any in place. Writer recommended completion of a HCP. Patient in agreement and HCP completed naming sister Karina Fang as health care agent and sister Mable Hirsch as alternate agent. Patient showed insight into medical condition. All questions answered. Psychosocial support provided.

## 2023-07-13 NOTE — CASE MANAGEMENT PROGRESS NOTE - NSCMPROGRESSNOTE_GEN_ALL_CORE
Patient is ready for transitional care. The patient is will be picked up by Son. The patients recommendation is to have OT/ PT outpatient. The patient is able to drive. The patient has a safe DC plan home.

## 2023-07-13 NOTE — DISCHARGE NOTE NURSING/CASE MANAGEMENT/SOCIAL WORK - NSDCPEFALRISK_GEN_ALL_CORE
For information on Fall & Injury Prevention, visit: [email or web address] OR  [email or web address] OR  https://www.cdc.gov/steadi/patient.html

## 2023-07-13 NOTE — PROGRESS NOTE ADULT - NUTRITIONAL ASSESSMENT
This patient has been assessed with a concern for Malnutrition and has been determined to have a diagnosis/diagnoses of Moderate protein-calorie malnutrition.    This patient is being managed with:   Diet Soft and Bite Sized-  Entered: Jul 11 2023  3:10PM  
This patient has been assessed with a concern for Malnutrition and has been determined to have a diagnosis/diagnoses of Moderate protein-calorie malnutrition.    This patient is being managed with:   Diet Soft and Bite Sized-  Entered: Jul 11 2023  3:10PM  
This patient has been assessed with a concern for Malnutrition and has been determined to have a diagnosis/diagnoses of Moderate protein-calorie malnutrition.    This patient is being managed with:   Diet Regular-  Entered: Jul 13 2023 11:54AM    This patient has been assessed with a concern for Malnutrition and has been determined to have a diagnosis/diagnoses of Moderate protein-calorie malnutrition.    This patient is being managed with:   Diet Regular-  Entered: Jul 13 2023 11:54AM  
This patient has been assessed with a concern for Malnutrition and has been determined to have a diagnosis/diagnoses of Moderate protein-calorie malnutrition.    This patient is being managed with:   Diet Soft and Bite Sized-  Entered: Jul 11 2023  3:10PM  
This patient has been assessed with a concern for Malnutrition and has been determined to have a diagnosis/diagnoses of Moderate protein-calorie malnutrition.    This patient is being managed with:   Diet Soft and Bite Sized-  Entered: Jul 11 2023  3:10PM

## 2023-08-03 ENCOUNTER — APPOINTMENT (OUTPATIENT)
Dept: SURGERY | Facility: CLINIC | Age: 58
End: 2023-08-03

## 2023-09-08 ENCOUNTER — NON-APPOINTMENT (OUTPATIENT)
Age: 58
End: 2023-09-08

## 2024-11-14 NOTE — DIETITIAN INITIAL EVALUATION ADULT - DATE OF WEIGHT PRIOR TO ADM
11/14/24 1456   Discharge Planning   Living Arrangements Spouse/significant other   Support Systems Spouse/significant other   Assistance Needed PT/OT rec. MOD for SNF, Patient declined SNF and HHC, 2L oxygen, [facility] provides oxygen   Type of Residence Private residence   Number of Stairs to Enter Residence 0   Number of Stairs Within Residence 0   Do you have animals or pets at home? No   Home or Post Acute Services None   Expected Discharge Disposition Home   Does the patient need discharge transport arranged? No     Patient denies any home going needs at this time, Will discuss home going needs again.   11-Feb-2023

## 2025-02-04 ENCOUNTER — NON-APPOINTMENT (OUTPATIENT)
Age: 60
End: 2025-02-04

## 2025-02-06 ENCOUNTER — NON-APPOINTMENT (OUTPATIENT)
Age: 60
End: 2025-02-06

## 2025-02-09 ENCOUNTER — NON-APPOINTMENT (OUTPATIENT)
Age: 60
End: 2025-02-09

## (undated) DEVICE — TUBE O2 SUPL CRUSH RESIS CONN SOUTHSIDE ONLY

## (undated) DEVICE — TRAP SPECIMEN SPUTUM 40CC

## (undated) DEVICE — SOL IRR NS 0.9% 250ML

## (undated) DEVICE — MASK O2 NON REBREATH 3IN1 ADULT

## (undated) DEVICE — STERIS DEFENDO 3-PIECE KIT (AIR/WATER, SUCTION & BIOPSY VALVES)

## (undated) DEVICE — CATH ELECHMSTAT  INJ 7FR 210CM

## (undated) DEVICE — TRAP QUICK CATCH  SINGL CHAMBER

## (undated) DEVICE — FORMALIN CUPS 10% BUFFERED

## (undated) DEVICE — CLAMP BX HOT RAD JAW 3

## (undated) DEVICE — SYR LUER SLIP TIP 30CC

## (undated) DEVICE — SNARE LRG

## (undated) DEVICE — MASK OXYGEN PANORAMIC

## (undated) DEVICE — ENDOCUFF VISION SZ 3 SM PRPL

## (undated) DEVICE — SNARE POLYP SENS 27MM 240CM

## (undated) DEVICE — FORCEP RADIAL JAW 4 JUMBO 2.8MM 3.2MM 240CM ORANGE DISP

## (undated) DEVICE — RETRIEVER ROTH NET PLATINUM-UNIVERSAL

## (undated) DEVICE — TUBE RECTAL 24FR

## (undated) DEVICE — SYR LUER SLIP TIP 50CC

## (undated) DEVICE — TUBING SUCTION CONN 6FT STERILE

## (undated) DEVICE — Device

## (undated) DEVICE — NDL INJ SCLERO INTERJECT 23G

## (undated) DEVICE — SUT HEWSON RETRIEVER

## (undated) DEVICE — SENSOR O2 FINGER XL ADULT 24/BX 6BX/CA

## (undated) DEVICE — TUBING IV SET SECONDARY 34"

## (undated) DEVICE — SET IV PUMP BLOOD 1VALVE 180FILTER NON-DEHP

## (undated) DEVICE — FORCEP RADIAL JAW 4 W NDL 2.4MM 2.8MM 240CM ORANGE DISP

## (undated) DEVICE — CATH IV SAFE BC 20G X 1.16" (PINK)

## (undated) DEVICE — CANISTER SUCTION 1200CC 10/SL

## (undated) DEVICE — TUBING CANNULA SALTER LABS NASAL ADULT 7FT

## (undated) DEVICE — BRUSH COLONOSCOPY CYTOLOGY

## (undated) DEVICE — VALVE BIOPSY

## (undated) DEVICE — SYR IV POSIFLUSH NS 3ML 30/TY

## (undated) DEVICE — PACK IV START WITH CHG

## (undated) DEVICE — TUBING IV SET GRAVITY 3Y 100" MACRO

## (undated) DEVICE — ENDOCUFF VISION SZ 2 LG GRN

## (undated) DEVICE — CATH IV SAFE BC 22G X 1" (BLUE)

## (undated) DEVICE — MARKER ENDO SPOT EX

## (undated) DEVICE — ELCTR GROUNDING PAD ADULT COVIDIEN

## (undated) DEVICE — SOL IRR POUR H2O 500ML

## (undated) DEVICE — SUCTION YANKAUER TAPERED BULBOUS NO VENT

## (undated) DEVICE — CATH ELCTR GLIDE PRB 7FR

## (undated) DEVICE — POLY TRAP ETRAP